# Patient Record
Sex: FEMALE | Race: WHITE | NOT HISPANIC OR LATINO | Employment: FULL TIME | URBAN - METROPOLITAN AREA
[De-identification: names, ages, dates, MRNs, and addresses within clinical notes are randomized per-mention and may not be internally consistent; named-entity substitution may affect disease eponyms.]

---

## 2017-01-04 ENCOUNTER — OFFICE VISIT (OUTPATIENT)
Dept: SURGERY | Facility: CLINIC | Age: 52
End: 2017-01-04

## 2017-01-04 DIAGNOSIS — Z09 SURGICAL FOLLOW-UP CARE: Primary | ICD-10-CM

## 2017-01-04 PROCEDURE — 99024 POSTOP FOLLOW-UP VISIT: CPT | Performed by: SURGERY

## 2017-01-04 NOTE — PROGRESS NOTES
5 wks s/p varun escobar pt is w/o complaints, incisions are healing well  Without complaints.  Her wounds are much improved.  She should cut back on the hydrogen peroxide shower daily ×1 week.  In then daily shower.  She may resume normal activities in 1 week.  I will see her back when necessary.

## 2017-01-04 NOTE — MR AVS SNAPSHOT
Marilyn Gottlieb   2017 1:15 PM   Office Visit    Dept Phone:  [phone]   Encounter #:  [id number]    Provider:  Clemente Meeks MD   Department:  [facility] GENERAL SURGERY                Your Full Care Plan              Your Updated Medication List          This list is accurate as of: 17  2:14 PM.  Always use your most recent med list.                amitriptyline 100 MG tablet   Commonly known as:  ELAVIL       lisinopril-hydrochlorothiazide 20-12.5 MG per tablet   Commonly known as:  PRINZIDE,ZESTORETIC       naproxen 500 MG tablet   Commonly known as:  NAPROSYN       omeprazole 20 MG capsule   Commonly known as:  priLOSEC       PARoxetine 40 MG tablet   Commonly known as:  PAXIL       topiramate 50 MG tablet   Commonly known as:  TOPAMAX               You Were Diagnosed With        Codes Comments    Surgical follow-up care    -  Primary ICD-10-CM: Z09  ICD-9-CM: V67.00       Instructions     None    Patient Instructions History      Upcoming Appointments     Visit Type Date Time Department    POST-OP 2017  1:15 PM [facility]      [employer] Signup     [facility] ipnexus allows you to send messages to your doctor, view your test results, renew your prescriptions, schedule appointments, and more. To sign up, go to [employer] and click on the Sign Up Now link in the New User? box. Enter your ipnexus Activation Code exactly as it appears below along with the last four digits of your Social Security Number and your Date of Birth () to complete the sign-up process. If you do not sign up before the expiration date, you must request a new code.    ipnexus Activation Code: [email or web address]  Expires: 2017  5:34 AM    If you have questions, you can email [email or web address] or call [phone] to talk to our ipnexus staff. Remember, ipnexus is NOT to be used for urgent needs. For medical emergencies, dial 911.               Other Info from Your Visit           Allergies     Codeine        Reason for Visit     Post-op Follow-up           Vital Signs     Smoking Status                   Current Every Day Smoker           Problems and Diagnoses Noted     Surgical follow-up care    -  Primary

## 2020-01-31 ENCOUNTER — APPOINTMENT (OUTPATIENT)
Dept: GENERAL RADIOLOGY | Facility: HOSPITAL | Age: 55
End: 2020-01-31

## 2020-01-31 ENCOUNTER — OFFICE VISIT (OUTPATIENT)
Dept: ORTHOPEDIC SURGERY | Facility: CLINIC | Age: 55
End: 2020-01-31

## 2020-01-31 ENCOUNTER — ANESTHESIA EVENT (OUTPATIENT)
Dept: PERIOP | Facility: HOSPITAL | Age: 55
End: 2020-01-31

## 2020-01-31 ENCOUNTER — PREP FOR SURGERY (OUTPATIENT)
Dept: OTHER | Facility: HOSPITAL | Age: 55
End: 2020-01-31

## 2020-01-31 ENCOUNTER — HOSPITAL ENCOUNTER (OUTPATIENT)
Facility: HOSPITAL | Age: 55
LOS: 1 days | Discharge: HOME | End: 2020-02-03
Attending: ORTHOPAEDIC SURGERY | Admitting: ORTHOPAEDIC SURGERY

## 2020-01-31 VITALS
HEIGHT: 65 IN | DIASTOLIC BLOOD PRESSURE: 66 MMHG | BODY MASS INDEX: 36.65 KG/M2 | SYSTOLIC BLOOD PRESSURE: 103 MMHG | HEART RATE: 101 BPM | WEIGHT: 220 LBS

## 2020-01-31 DIAGNOSIS — S93.432A SYNDESMOTIC DISRUPTION OF LEFT ANKLE, INITIAL ENCOUNTER: ICD-10-CM

## 2020-01-31 DIAGNOSIS — S82.62XA CLOSED DISPLACED FRACTURE OF LATERAL MALLEOLUS OF LEFT FIBULA, INITIAL ENCOUNTER: ICD-10-CM

## 2020-01-31 DIAGNOSIS — R52 PAIN: Primary | ICD-10-CM

## 2020-01-31 DIAGNOSIS — S82.62XA CLOSED DISPLACED FRACTURE OF LATERAL MALLEOLUS OF LEFT FIBULA, INITIAL ENCOUNTER: Primary | ICD-10-CM

## 2020-01-31 DIAGNOSIS — S80.02XA CONTUSION OF LEFT KNEE, INITIAL ENCOUNTER: ICD-10-CM

## 2020-01-31 DIAGNOSIS — S93.421A SPRAIN OF DELTOID LIGAMENT OF RIGHT ANKLE, INITIAL ENCOUNTER: ICD-10-CM

## 2020-01-31 PROBLEM — S82.63XA LATERAL MALLEOLAR FRACTURE: Status: ACTIVE | Noted: 2020-01-31

## 2020-01-31 LAB
ANION GAP SERPL CALCULATED.3IONS-SCNC: 10.6 MMOL/L (ref 5–15)
ANION GAP SERPL CALCULATED.3IONS-SCNC: 13 MMOL/L (ref 5–15)
ARTERIAL PATENCY WRIST A: POSITIVE
ATMOSPHERIC PRESS: 741 MMHG
ATMOSPHERIC PRESS: 741 MMHG
BASE EXCESS BLDA CALC-SCNC: 2.9 MMOL/L (ref 0–2)
BASE EXCESS BLDV CALC-SCNC: 4 MMOL/L (ref 0–2)
BASOPHILS # BLD AUTO: 0.03 10*3/MM3 (ref 0–0.2)
BASOPHILS NFR BLD AUTO: 0.3 % (ref 0–1.5)
BDY SITE: ABNORMAL
BDY SITE: ABNORMAL
BODY TEMPERATURE: 37 C
BODY TEMPERATURE: 37 C
BUN BLD-MCNC: 6 MG/DL (ref 6–20)
BUN BLD-MCNC: 8 MG/DL (ref 6–20)
BUN/CREAT SERPL: 10.7 (ref 7–25)
BUN/CREAT SERPL: 8.6 (ref 7–25)
CALCIUM SPEC-SCNC: 9.1 MG/DL (ref 8.6–10.5)
CALCIUM SPEC-SCNC: 9.2 MG/DL (ref 8.6–10.5)
CHLORIDE SERPL-SCNC: 83 MMOL/L (ref 98–107)
CHLORIDE SERPL-SCNC: 86 MMOL/L (ref 98–107)
CO2 SERPL-SCNC: 28 MMOL/L (ref 22–29)
CO2 SERPL-SCNC: 29.4 MMOL/L (ref 22–29)
CREAT BLD-MCNC: 0.7 MG/DL (ref 0.57–1)
CREAT BLD-MCNC: 0.75 MG/DL (ref 0.57–1)
DEPRECATED RDW RBC AUTO: 45.1 FL (ref 37–54)
EOSINOPHIL # BLD AUTO: 0.12 10*3/MM3 (ref 0–0.4)
EOSINOPHIL NFR BLD AUTO: 1.3 % (ref 0.3–6.2)
ERYTHROCYTE [DISTWIDTH] IN BLOOD BY AUTOMATED COUNT: 14.2 % (ref 12.3–15.4)
GAS FLOW AIRWAY: 3 LPM
GAS FLOW AIRWAY: 3 LPM
GFR SERPL CREATININE-BSD FRML MDRD: 81 ML/MIN/1.73
GFR SERPL CREATININE-BSD FRML MDRD: 88 ML/MIN/1.73
GLUCOSE BLD-MCNC: 196 MG/DL (ref 65–99)
GLUCOSE BLD-MCNC: 209 MG/DL (ref 65–99)
GLUCOSE BLDC GLUCOMTR-MCNC: 234 MG/DL (ref 70–130)
GLUCOSE BLDC GLUCOMTR-MCNC: 258 MG/DL (ref 70–130)
HBA1C MFR BLD: 7.6 % (ref 4.8–5.6)
HCO3 BLDA-SCNC: 29.6 MMOL/L (ref 20–26)
HCO3 BLDV-SCNC: 31.9 MMOL/L (ref 22–28)
HCT VFR BLD AUTO: 34.8 % (ref 34–46.6)
HGB BLD-MCNC: 12.1 G/DL (ref 12–15.9)
HGB BLDA-MCNC: 12.6 G/DL (ref 13.5–17.5)
HGB BLDA-MCNC: 13.1 G/DL (ref 13.5–17.5)
IMM GRANULOCYTES # BLD AUTO: 0.04 10*3/MM3 (ref 0–0.05)
IMM GRANULOCYTES NFR BLD AUTO: 0.4 % (ref 0–0.5)
LYMPHOCYTES # BLD AUTO: 1.52 10*3/MM3 (ref 0.7–3.1)
LYMPHOCYTES NFR BLD AUTO: 16.3 % (ref 19.6–45.3)
Lab: ABNORMAL
MCH RBC QN AUTO: 30.2 PG (ref 26.6–33)
MCHC RBC AUTO-ENTMCNC: 34.8 G/DL (ref 31.5–35.7)
MCV RBC AUTO: 86.8 FL (ref 79–97)
MODALITY: ABNORMAL
MODALITY: ABNORMAL
MONOCYTES # BLD AUTO: 0.27 10*3/MM3 (ref 0.1–0.9)
MONOCYTES NFR BLD AUTO: 2.9 % (ref 5–12)
NEUTROPHILS # BLD AUTO: 7.32 10*3/MM3 (ref 1.7–7)
NEUTROPHILS NFR BLD AUTO: 78.8 % (ref 42.7–76)
NOTIFIED BY: ABNORMAL
NOTIFIED WHO: ABNORMAL
NRBC BLD AUTO-RTO: 0 /100 WBC (ref 0–0.2)
PCO2 BLDA: 53.6 MM HG (ref 35–45)
PCO2 BLDV: 62.2 MM HG (ref 41–51)
PCO2 TEMP ADJ BLD: 53.6 MM HG (ref 35–45)
PH BLDA: 7.35 PH UNITS (ref 7.35–7.45)
PH BLDV: 7.32 PH UNITS (ref 7.32–7.42)
PH, TEMP CORRECTED: 7.35 PH UNITS (ref 7.35–7.45)
PLATELET # BLD AUTO: 188 10*3/MM3 (ref 140–450)
PMV BLD AUTO: 9.8 FL (ref 6–12)
PO2 BLDA: 88 MM HG (ref 83–108)
PO2 BLDV: 24.9 MM HG (ref 27–53)
PO2 TEMP ADJ BLD: 88 MM HG (ref 83–108)
POTASSIUM BLD-SCNC: 3.7 MMOL/L (ref 3.5–5.2)
POTASSIUM BLD-SCNC: 3.7 MMOL/L (ref 3.5–5.2)
RBC # BLD AUTO: 4.01 10*6/MM3 (ref 3.77–5.28)
SAO2 % BLDCOA: 97.3 % (ref 94–99)
SAO2 % BLDCOV: 37.1 % (ref 45–75)
SODIUM BLD-SCNC: 123 MMOL/L (ref 136–145)
SODIUM BLD-SCNC: 127 MMOL/L (ref 136–145)
VENTILATOR MODE: ABNORMAL
VENTILATOR MODE: ABNORMAL
WBC NRBC COR # BLD: 9.3 10*3/MM3 (ref 3.4–10.8)

## 2020-01-31 PROCEDURE — 90674 CCIIV4 VAC NO PRSV 0.5 ML IM: CPT | Performed by: ORTHOPAEDIC SURGERY

## 2020-01-31 PROCEDURE — 73600 X-RAY EXAM OF ANKLE: CPT | Performed by: ORTHOPAEDIC SURGERY

## 2020-01-31 PROCEDURE — S0260 H&P FOR SURGERY: HCPCS | Performed by: ORTHOPAEDIC SURGERY

## 2020-01-31 PROCEDURE — 63710000001 INSULIN ASPART PER 5 UNITS: Performed by: HOSPITALIST

## 2020-01-31 PROCEDURE — 36600 WITHDRAWAL OF ARTERIAL BLOOD: CPT

## 2020-01-31 PROCEDURE — G0008 ADMIN INFLUENZA VIRUS VAC: HCPCS | Performed by: ORTHOPAEDIC SURGERY

## 2020-01-31 PROCEDURE — 83036 HEMOGLOBIN GLYCOSYLATED A1C: CPT | Performed by: HOSPITALIST

## 2020-01-31 PROCEDURE — 82803 BLOOD GASES ANY COMBINATION: CPT

## 2020-01-31 PROCEDURE — 25010000002 HYDROMORPHONE PER 4 MG: Performed by: ORTHOPAEDIC SURGERY

## 2020-01-31 PROCEDURE — 73610 X-RAY EXAM OF ANKLE: CPT | Performed by: ORTHOPAEDIC SURGERY

## 2020-01-31 PROCEDURE — 80048 BASIC METABOLIC PNL TOTAL CA: CPT | Performed by: ORTHOPAEDIC SURGERY

## 2020-01-31 PROCEDURE — 99204 OFFICE O/P NEW MOD 45 MIN: CPT | Performed by: ORTHOPAEDIC SURGERY

## 2020-01-31 PROCEDURE — 85025 COMPLETE CBC W/AUTO DIFF WBC: CPT | Performed by: ORTHOPAEDIC SURGERY

## 2020-01-31 PROCEDURE — 71046 X-RAY EXAM CHEST 2 VIEWS: CPT

## 2020-01-31 PROCEDURE — 25010000002 NALOXONE PER 1 MG: Performed by: HOSPITALIST

## 2020-01-31 PROCEDURE — 25010000002 INFLUENZA VAC SUBUNIT QUAD 0.5 ML SUSPENSION PREFILLED SYRINGE: Performed by: ORTHOPAEDIC SURGERY

## 2020-01-31 PROCEDURE — 93010 ELECTROCARDIOGRAM REPORT: CPT | Performed by: INTERNAL MEDICINE

## 2020-01-31 PROCEDURE — 96376 TX/PRO/DX INJ SAME DRUG ADON: CPT

## 2020-01-31 PROCEDURE — 93005 ELECTROCARDIOGRAM TRACING: CPT | Performed by: ORTHOPAEDIC SURGERY

## 2020-01-31 PROCEDURE — 94799 UNLISTED PULMONARY SVC/PX: CPT

## 2020-01-31 PROCEDURE — G0379 DIRECT REFER HOSPITAL OBSERV: HCPCS

## 2020-01-31 PROCEDURE — 25010000002 KETOROLAC TROMETHAMINE PER 15 MG: Performed by: ORTHOPAEDIC SURGERY

## 2020-01-31 PROCEDURE — 96375 TX/PRO/DX INJ NEW DRUG ADDON: CPT

## 2020-01-31 PROCEDURE — 82962 GLUCOSE BLOOD TEST: CPT

## 2020-01-31 PROCEDURE — 73560 X-RAY EXAM OF KNEE 1 OR 2: CPT | Performed by: ORTHOPAEDIC SURGERY

## 2020-01-31 PROCEDURE — G0378 HOSPITAL OBSERVATION PER HR: HCPCS

## 2020-01-31 PROCEDURE — 80048 BASIC METABOLIC PNL TOTAL CA: CPT | Performed by: HOSPITALIST

## 2020-01-31 PROCEDURE — 99243 OFF/OP CNSLTJ NEW/EST LOW 30: CPT | Performed by: HOSPITALIST

## 2020-01-31 RX ORDER — SODIUM CHLORIDE 0.9 % (FLUSH) 0.9 %
10 SYRINGE (ML) INJECTION AS NEEDED
Status: DISCONTINUED | OUTPATIENT
Start: 2020-01-31 | End: 2020-02-03 | Stop reason: HOSPADM

## 2020-01-31 RX ORDER — TOPIRAMATE 25 MG/1
50 TABLET ORAL NIGHTLY
Status: DISCONTINUED | OUTPATIENT
Start: 2020-01-31 | End: 2020-02-01

## 2020-01-31 RX ORDER — HYDROMORPHONE HYDROCHLORIDE 1 MG/ML
1 INJECTION, SOLUTION INTRAMUSCULAR; INTRAVENOUS; SUBCUTANEOUS
Status: DISCONTINUED | OUTPATIENT
Start: 2020-01-31 | End: 2020-02-01

## 2020-01-31 RX ORDER — SODIUM CHLORIDE 0.9 % (FLUSH) 0.9 %
10 SYRINGE (ML) INJECTION EVERY 12 HOURS SCHEDULED
Status: DISCONTINUED | OUTPATIENT
Start: 2020-01-31 | End: 2020-02-03 | Stop reason: HOSPADM

## 2020-01-31 RX ORDER — SODIUM CHLORIDE, SODIUM LACTATE, POTASSIUM CHLORIDE, CALCIUM CHLORIDE 600; 310; 30; 20 MG/100ML; MG/100ML; MG/100ML; MG/100ML
30 INJECTION, SOLUTION INTRAVENOUS CONTINUOUS
Status: DISCONTINUED | OUTPATIENT
Start: 2020-01-31 | End: 2020-01-31

## 2020-01-31 RX ORDER — PANTOPRAZOLE SODIUM 40 MG/1
40 TABLET, DELAYED RELEASE ORAL EVERY MORNING
Status: DISCONTINUED | OUTPATIENT
Start: 2020-02-01 | End: 2020-02-03 | Stop reason: HOSPADM

## 2020-01-31 RX ORDER — PREGABALIN 75 MG/1
150 CAPSULE ORAL ONCE
Status: CANCELLED | OUTPATIENT
Start: 2020-01-31 | End: 2020-01-31

## 2020-01-31 RX ORDER — AMITRIPTYLINE HYDROCHLORIDE 25 MG/1
100 TABLET, FILM COATED ORAL NIGHTLY
Status: DISCONTINUED | OUTPATIENT
Start: 2020-01-31 | End: 2020-02-01

## 2020-01-31 RX ORDER — DOCUSATE SODIUM 100 MG/1
100 CAPSULE, LIQUID FILLED ORAL DAILY
Status: DISCONTINUED | OUTPATIENT
Start: 2020-01-31 | End: 2020-02-03 | Stop reason: HOSPADM

## 2020-01-31 RX ORDER — SODIUM CHLORIDE 9 MG/ML
40 INJECTION, SOLUTION INTRAVENOUS AS NEEDED
Status: DISCONTINUED | OUTPATIENT
Start: 2020-01-31 | End: 2020-02-03 | Stop reason: HOSPADM

## 2020-01-31 RX ORDER — NICOTINE 21 MG/24HR
1 PATCH, TRANSDERMAL 24 HOURS TRANSDERMAL
Status: DISCONTINUED | OUTPATIENT
Start: 2020-01-31 | End: 2020-02-03 | Stop reason: HOSPADM

## 2020-01-31 RX ORDER — ACETAMINOPHEN 500 MG
1000 TABLET ORAL ONCE
Status: CANCELLED | OUTPATIENT
Start: 2020-02-01

## 2020-01-31 RX ORDER — CEFAZOLIN SODIUM 2 G/50ML
2 SOLUTION INTRAVENOUS ONCE
Status: CANCELLED | OUTPATIENT
Start: 2020-02-01

## 2020-01-31 RX ORDER — NALOXONE HCL 0.4 MG/ML
0.4 VIAL (ML) INJECTION
Status: DISCONTINUED | OUTPATIENT
Start: 2020-01-31 | End: 2020-02-01

## 2020-01-31 RX ORDER — PAROXETINE HYDROCHLORIDE 20 MG/1
40 TABLET, FILM COATED ORAL NIGHTLY
Status: DISCONTINUED | OUTPATIENT
Start: 2020-01-31 | End: 2020-02-03 | Stop reason: HOSPADM

## 2020-01-31 RX ORDER — SODIUM CHLORIDE 9 MG/ML
100 INJECTION, SOLUTION INTRAVENOUS CONTINUOUS
Status: DISCONTINUED | OUTPATIENT
Start: 2020-01-31 | End: 2020-01-31

## 2020-01-31 RX ORDER — 3% SODIUM CHLORIDE 3 G/100ML
1000 INJECTION, SOLUTION INTRAVENOUS CONTINUOUS
Status: DISCONTINUED | OUTPATIENT
Start: 2020-02-01 | End: 2020-02-01 | Stop reason: SDUPTHER

## 2020-01-31 RX ORDER — DEXTROSE MONOHYDRATE 25 G/50ML
25 INJECTION, SOLUTION INTRAVENOUS
Status: DISCONTINUED | OUTPATIENT
Start: 2020-01-31 | End: 2020-02-03 | Stop reason: HOSPADM

## 2020-01-31 RX ORDER — KETOROLAC TROMETHAMINE 30 MG/ML
30 INJECTION, SOLUTION INTRAMUSCULAR; INTRAVENOUS EVERY 6 HOURS PRN
Status: DISCONTINUED | OUTPATIENT
Start: 2020-01-31 | End: 2020-02-03 | Stop reason: HOSPADM

## 2020-01-31 RX ORDER — ONDANSETRON 2 MG/ML
4 INJECTION INTRAMUSCULAR; INTRAVENOUS EVERY 6 HOURS PRN
Status: DISCONTINUED | OUTPATIENT
Start: 2020-01-31 | End: 2020-02-03 | Stop reason: HOSPADM

## 2020-01-31 RX ORDER — HYDROCODONE BITARTRATE AND ACETAMINOPHEN 5; 325 MG/1; MG/1
1 TABLET ORAL EVERY 6 HOURS PRN
COMMUNITY
Start: 2020-01-30 | End: 2020-02-03 | Stop reason: HOSPADM

## 2020-01-31 RX ORDER — NICOTINE POLACRILEX 4 MG
15 LOZENGE BUCCAL
Status: DISCONTINUED | OUTPATIENT
Start: 2020-01-31 | End: 2020-02-03 | Stop reason: HOSPADM

## 2020-01-31 RX ADMIN — HYDROMORPHONE HYDROCHLORIDE 1 MG: 1 INJECTION, SOLUTION INTRAMUSCULAR; INTRAVENOUS; SUBCUTANEOUS at 15:55

## 2020-01-31 RX ADMIN — HYDROMORPHONE HYDROCHLORIDE 1 MG: 1 INJECTION, SOLUTION INTRAMUSCULAR; INTRAVENOUS; SUBCUTANEOUS at 18:14

## 2020-01-31 RX ADMIN — INFLUENZA A VIRUS A/IDAHO/07/2018 (H1N1) ANTIGEN (MDCK CELL DERIVED, PROPIOLACTONE INACTIVATED, INFLUENZA A VIRUS A/INDIANA/08/2018 (H3N2) ANTIGEN (MDCK CELL DERIVED, PROPIOLACTONE INACTIVATED), INFLUENZA B VIRUS B/SINGAPORE/INFTT-16-0610/2016 ANTIGEN (MDCK CELL DERIVED, PROPIOLACTONE INACTIVATED), INFLUENZA B VIRUS B/IOWA/06/2017 ANTIGEN (MDCK CELL DERIVED, PROPIOLACTONE INACTIVATED) 0.5 ML: 15; 15; 15; 15 INJECTION, SUSPENSION INTRAMUSCULAR at 17:41

## 2020-01-31 RX ADMIN — DOCUSATE SODIUM 100 MG: 100 CAPSULE, LIQUID FILLED ORAL at 17:40

## 2020-01-31 RX ADMIN — NICOTINE 1 PATCH: 7 PATCH, EXTENDED RELEASE TRANSDERMAL at 17:40

## 2020-01-31 RX ADMIN — INSULIN ASPART 3 UNITS: 100 INJECTION, SOLUTION INTRAVENOUS; SUBCUTANEOUS at 21:39

## 2020-01-31 RX ADMIN — NALOXONE HYDROCHLORIDE 0.4 MG: 0.4 INJECTION, SOLUTION INTRAMUSCULAR; INTRAVENOUS; SUBCUTANEOUS at 21:17

## 2020-01-31 RX ADMIN — KETOROLAC TROMETHAMINE 30 MG: 30 INJECTION, SOLUTION INTRAMUSCULAR at 21:39

## 2020-01-31 RX ADMIN — SODIUM CHLORIDE, PRESERVATIVE FREE 10 ML: 5 INJECTION INTRAVENOUS at 17:42

## 2020-01-31 NOTE — PROGRESS NOTES
Subjective: Left lower leg pain, right ankle pain     Patient ID: Marilyn Gottlieb is a 55 y.o. female.    Chief Complaint:    History of Present Illness 55-year-old female is seen for left leg and right ankle she injured yesterday at her home.  States she was walking at the foot of her driveway when she slipped apparently on a wet rock injuring primarily the left leg falling on her knee and in the ankle developed immediate pain and discomfort.  Was able to ambulate putting weight of the right ankle and was seen in the [facility] ER x-rayed and now presents here but since her injury yesterday the right ankle is now painful and was unable to bear much weight on the right ankle.       Social History     Occupational History   • Not on file   Tobacco Use   • Smoking status: Current Every Day Smoker     Packs/day: 1.00     Years: 20.00     Pack years: 20.00   • Smokeless tobacco: Never Used   Substance and Sexual Activity   • Alcohol use: No   • Drug use: No   • Sexual activity: Defer      Review of Systems   Constitutional: Negative for chills, diaphoresis, fever and unexpected weight change.   HENT: Negative for hearing loss, nosebleeds, sore throat and tinnitus.    Eyes: Negative for pain and visual disturbance.   Respiratory: Negative for cough, shortness of breath and wheezing.    Cardiovascular: Negative for chest pain and palpitations.   Gastrointestinal: Negative for abdominal pain, diarrhea, nausea and vomiting.   Endocrine: Negative for cold intolerance, heat intolerance and polydipsia.   Genitourinary: Negative for difficulty urinating, dysuria and hematuria.   Musculoskeletal: Positive for arthralgias and myalgias. Negative for joint swelling.   Skin: Negative for rash and wound.   Allergic/Immunologic: Negative for environmental allergies.   Neurological: Negative for dizziness, syncope and numbness.   Hematological: Does not bruise/bleed easily.   Psychiatric/Behavioral: Negative for dysphoric mood and  sleep disturbance. The patient is not nervous/anxious.          Past Medical History:   Diagnosis Date   • Acid reflux    • Anxiety    • Depression    • Eczema     ON TRUNK, BILATERAL ARMS, PATIENT SCRATCHES   • High cholesterol     SUPPOSED TO BE ON MEDICATIONS   • Hypertension    • Migraine     TAKES TOPAMAX   • MRSA infection     LEFT AXILLA 2014   • Panic attacks      Past Surgical History:   Procedure Laterality Date   • CHOLECYSTECTOMY N/A 11/30/2016    Procedure: CHOLECYSTECTOMY LAPAROSCOPIC;  Surgeon: Clemente Meeks MD;  Location: [facility];  Service:    • HYSTERECTOMY      ABDOMINAL   • ROTATOR CUFF REPAIR     • TUBAL ABDOMINAL LIGATION       Family History   Family history unknown: Yes         Objective:  Vitals:    01/31/20 1154   BP: 103/66   Pulse: 101         01/31/20  1154   Weight: 99.8 kg (220 lb)     Body mass index is 36.61 kg/m².        Ortho Exam    AP lateral oblique view of the left ankle from [facility] ER reviewed by me done yesterday shows a fracture of the lateral malleolus with the syndesmotic disruption and avulsion off of the medial malleolus i.e. a Jiang B fracture.  Repeat x-rays were done in the office today and again it shows much better clarity the lateral malleolar fracture with syndesmotic disruption with the avulsion off of the tip of the medial malleolus.  X-rays of the knee show what may be an avulsion type fracture of the tibial tubercle but there is no significant displacement.  X-ray of the right ankle is unremarkable.  No prior x-rays available in the right ankle.  She is alert and oriented x3.  Has normocephalic and sclerae clear.  With regard to the right ankle there is no swelling noted but there is tenderness over the medial deltoid.  Her calf is nontender as is her Achilles tendon.  Minimal tenderness laterally.  No instability with a negative drawer sign.  Good distal pulses good capillary refill no numbness or paresthesia.  With regard to the right leg there is  an abrasion over the tibial tubercle of the right leg the knee has no effusion swelling or erythema.  The right ankle shows bilateral swelling and tenderness over the lateral malleolus tenderness medially over the medial malleolus.  The Achilles tendon is negative and the calf is nontender.  She has good capillary refill severe pain with any attempted passive range of motion and holds the foot in a plantar flexed position.  She is good distal pulses skin is cool to touch is no abrasions about the ankle.    Assessment:        1. Pain    2. Closed displaced fracture of lateral malleolus of left fibula, initial encounter    3. Syndesmotic disruption of left ankle, initial encounter    4. Sprain of deltoid ligament of right ankle, initial encounter    5. Contusion of left knee, initial encounter           Plan: Spent over 25 minutes with the patient reviewing her x-rays and both legs and the x-ray of the left ankle particular showing that lateral malleolar fracture the syndesmotic disruption I discussed the surgery with her and feels the risk of surgery which include but not limited to infection need for multiple procedures to eradicate the infection including implant removal, delayed union or nonunion need for further surgery particular smoker she is.  Vascular injury, nerve injury paresthesia paralysis, tendon injury, the need for further surgery, and persistent pain despite a well-healed fracture.  Discussed the rehab which is 3 to 5 months and she understands.  Will admit to the hospital today for pain control proceed with surgery tomorrow.            Work Status:    AYSE query complete.    Orders:  Orders Placed This Encounter   Procedures   • XR Ankle 3+ View Right   • XR Ankle 2 View Left   • XR Knee 1 or 2 View Left       Medications:  No orders of the defined types were placed in this encounter.      Followup:  Return in about 2 weeks (around 2/14/2020).          Dictated utilizing Dragon dictation

## 2020-02-01 ENCOUNTER — ANESTHESIA (OUTPATIENT)
Dept: PERIOP | Facility: HOSPITAL | Age: 55
End: 2020-02-01

## 2020-02-01 ENCOUNTER — APPOINTMENT (OUTPATIENT)
Dept: GENERAL RADIOLOGY | Facility: HOSPITAL | Age: 55
End: 2020-02-01

## 2020-02-01 LAB
AMPHET+METHAMPHET UR QL: NEGATIVE
AMPHETAMINES UR QL: NEGATIVE
ANION GAP SERPL CALCULATED.3IONS-SCNC: 11.5 MMOL/L (ref 5–15)
BARBITURATES UR QL SCN: NEGATIVE
BENZODIAZ UR QL SCN: NEGATIVE
BUN BLD-MCNC: 9 MG/DL (ref 6–20)
BUN/CREAT SERPL: 13.2 (ref 7–25)
BUPRENORPHINE SERPL-MCNC: NEGATIVE NG/ML
CALCIUM SPEC-SCNC: 9.4 MG/DL (ref 8.6–10.5)
CANNABINOIDS SERPL QL: NEGATIVE
CHLORIDE SERPL-SCNC: 92 MMOL/L (ref 98–107)
CO2 SERPL-SCNC: 28.5 MMOL/L (ref 22–29)
COCAINE UR QL: NEGATIVE
CREAT BLD-MCNC: 0.68 MG/DL (ref 0.57–1)
GFR SERPL CREATININE-BSD FRML MDRD: 91 ML/MIN/1.73
GLUCOSE BLD-MCNC: 185 MG/DL (ref 65–99)
GLUCOSE BLDC GLUCOMTR-MCNC: 198 MG/DL (ref 70–130)
GLUCOSE BLDC GLUCOMTR-MCNC: 207 MG/DL (ref 70–130)
GLUCOSE BLDC GLUCOMTR-MCNC: 212 MG/DL (ref 70–130)
METHADONE UR QL SCN: NEGATIVE
OPIATES UR QL: POSITIVE
OXYCODONE UR QL SCN: NEGATIVE
PCP UR QL SCN: NEGATIVE
POTASSIUM BLD-SCNC: 4.2 MMOL/L (ref 3.5–5.2)
PROPOXYPH UR QL: NEGATIVE
SODIUM BLD-SCNC: 132 MMOL/L (ref 136–145)
TRICYCLICS UR QL SCN: POSITIVE
TSH SERPL DL<=0.05 MIU/L-ACNC: 2.98 UIU/ML (ref 0.27–4.2)

## 2020-02-01 PROCEDURE — 76000 FLUOROSCOPY <1 HR PHYS/QHP: CPT

## 2020-02-01 PROCEDURE — 96365 THER/PROPH/DIAG IV INF INIT: CPT

## 2020-02-01 PROCEDURE — 25010000002 MIDAZOLAM PER 1MG: Performed by: NURSE ANESTHETIST, CERTIFIED REGISTERED

## 2020-02-01 PROCEDURE — 63710000001 INSULIN ASPART PER 5 UNITS: Performed by: ORTHOPAEDIC SURGERY

## 2020-02-01 PROCEDURE — 94640 AIRWAY INHALATION TREATMENT: CPT

## 2020-02-01 PROCEDURE — G0378 HOSPITAL OBSERVATION PER HR: HCPCS

## 2020-02-01 PROCEDURE — 82962 GLUCOSE BLOOD TEST: CPT

## 2020-02-01 PROCEDURE — 25010000002 FENTANYL CITRATE (PF) 100 MCG/2ML SOLUTION: Performed by: NURSE ANESTHETIST, CERTIFIED REGISTERED

## 2020-02-01 PROCEDURE — 80048 BASIC METABOLIC PNL TOTAL CA: CPT | Performed by: FAMILY MEDICINE

## 2020-02-01 PROCEDURE — 25010000002 DEXAMETHASONE PER 1 MG: Performed by: NURSE ANESTHETIST, CERTIFIED REGISTERED

## 2020-02-01 PROCEDURE — 27829 TREAT LOWER LEG JOINT: CPT | Performed by: ORTHOPAEDIC SURGERY

## 2020-02-01 PROCEDURE — 73600 X-RAY EXAM OF ANKLE: CPT

## 2020-02-01 PROCEDURE — 27814 TREATMENT OF ANKLE FRACTURE: CPT | Performed by: ORTHOPAEDIC SURGERY

## 2020-02-01 PROCEDURE — 94799 UNLISTED PULMONARY SVC/PX: CPT

## 2020-02-01 PROCEDURE — 97161 PT EVAL LOW COMPLEX 20 MIN: CPT

## 2020-02-01 PROCEDURE — C1713 ANCHOR/SCREW BN/BN,TIS/BN: HCPCS | Performed by: ORTHOPAEDIC SURGERY

## 2020-02-01 PROCEDURE — 96376 TX/PRO/DX INJ SAME DRUG ADON: CPT

## 2020-02-01 PROCEDURE — 96366 THER/PROPH/DIAG IV INF ADDON: CPT

## 2020-02-01 PROCEDURE — 25010000002 KETOROLAC TROMETHAMINE PER 15 MG: Performed by: ORTHOPAEDIC SURGERY

## 2020-02-01 PROCEDURE — 97110 THERAPEUTIC EXERCISES: CPT

## 2020-02-01 PROCEDURE — 25010000002 KETOROLAC TROMETHAMINE PER 15 MG: Performed by: NURSE ANESTHETIST, CERTIFIED REGISTERED

## 2020-02-01 PROCEDURE — 25010000003 BUPIVACAINE LIPOSOME 1.3 % SUSPENSION: Performed by: ORTHOPAEDIC SURGERY

## 2020-02-01 PROCEDURE — 25010000002 ROPIVACAINE PER 1 MG: Performed by: NURSE ANESTHETIST, CERTIFIED REGISTERED

## 2020-02-01 PROCEDURE — 25010000002 ONDANSETRON PER 1 MG: Performed by: NURSE ANESTHETIST, CERTIFIED REGISTERED

## 2020-02-01 PROCEDURE — 94770: CPT

## 2020-02-01 PROCEDURE — C9290 INJ, BUPIVACAINE LIPOSOME: HCPCS | Performed by: ORTHOPAEDIC SURGERY

## 2020-02-01 PROCEDURE — 99225 PR SBSQ OBSERVATION CARE/DAY 25 MINUTES: CPT | Performed by: HOSPITALIST

## 2020-02-01 PROCEDURE — 25010000003 CEFAZOLIN SODIUM-DEXTROSE 2-3 GM-%(50ML) RECONSTITUTED SOLUTION: Performed by: ORTHOPAEDIC SURGERY

## 2020-02-01 PROCEDURE — 25010000002 PROPOFOL 10 MG/ML EMULSION: Performed by: NURSE ANESTHETIST, CERTIFIED REGISTERED

## 2020-02-01 PROCEDURE — 80306 DRUG TEST PRSMV INSTRMNT: CPT | Performed by: HOSPITALIST

## 2020-02-01 PROCEDURE — 25010000002 VANCOMYCIN 1 G RECONSTITUTED SOLUTION: Performed by: ORTHOPAEDIC SURGERY

## 2020-02-01 PROCEDURE — 25010000002 MAGNESIUM SULFATE 2 GM/50ML SOLUTION: Performed by: NURSE ANESTHETIST, CERTIFIED REGISTERED

## 2020-02-01 PROCEDURE — 84443 ASSAY THYROID STIM HORMONE: CPT | Performed by: HOSPITALIST

## 2020-02-01 DEVICE — LOCKING SCREW, T10
Type: IMPLANTABLE DEVICE | Site: ANKLE | Status: FUNCTIONAL
Brand: VARIAX

## 2020-02-01 DEVICE — BONE SCREW, T10
Type: IMPLANTABLE DEVICE | Site: ANKLE | Status: FUNCTIONAL
Brand: VARIAX

## 2020-02-01 DEVICE — KT SYNDESMOSIS REPR ANKL TIGHTROPE KNOTLS TI: Type: IMPLANTABLE DEVICE | Site: ANKLE | Status: FUNCTIONAL

## 2020-02-01 DEVICE — DISTAL LATERAL FIBULA PLATE, 4 HOLE
Type: IMPLANTABLE DEVICE | Site: ANKLE | Status: FUNCTIONAL
Brand: VARIAX

## 2020-02-01 DEVICE — WAX BONE HEMO NAT 2.5G: Type: IMPLANTABLE DEVICE | Site: ANKLE | Status: FUNCTIONAL

## 2020-02-01 RX ORDER — FENTANYL CITRATE 50 UG/ML
INJECTION, SOLUTION INTRAMUSCULAR; INTRAVENOUS AS NEEDED
Status: DISCONTINUED | OUTPATIENT
Start: 2020-02-01 | End: 2020-02-01 | Stop reason: SURG

## 2020-02-01 RX ORDER — FAMOTIDINE 10 MG/ML
20 INJECTION, SOLUTION INTRAVENOUS
Status: COMPLETED | OUTPATIENT
Start: 2020-02-01 | End: 2020-02-01

## 2020-02-01 RX ORDER — ROPIVACAINE HYDROCHLORIDE 5 MG/ML
INJECTION, SOLUTION EPIDURAL; INFILTRATION; PERINEURAL
Status: COMPLETED | OUTPATIENT
Start: 2020-02-01 | End: 2020-02-01

## 2020-02-01 RX ORDER — ONDANSETRON 2 MG/ML
4 INJECTION INTRAMUSCULAR; INTRAVENOUS ONCE AS NEEDED
Status: DISCONTINUED | OUTPATIENT
Start: 2020-02-01 | End: 2020-02-01 | Stop reason: HOSPADM

## 2020-02-01 RX ORDER — ONDANSETRON 2 MG/ML
4 INJECTION INTRAMUSCULAR; INTRAVENOUS ONCE AS NEEDED
Status: COMPLETED | OUTPATIENT
Start: 2020-02-01 | End: 2020-02-01

## 2020-02-01 RX ORDER — ASPIRIN 325 MG
325 TABLET ORAL EVERY 12 HOURS SCHEDULED
Status: DISCONTINUED | OUTPATIENT
Start: 2020-02-02 | End: 2020-02-03 | Stop reason: HOSPADM

## 2020-02-01 RX ORDER — DEXMEDETOMIDINE HYDROCHLORIDE 100 UG/ML
INJECTION, SOLUTION INTRAVENOUS AS NEEDED
Status: DISCONTINUED | OUTPATIENT
Start: 2020-02-01 | End: 2020-02-01 | Stop reason: SURG

## 2020-02-01 RX ORDER — LORAZEPAM 2 MG/ML
1 INJECTION INTRAMUSCULAR
Status: DISCONTINUED | OUTPATIENT
Start: 2020-02-01 | End: 2020-02-01 | Stop reason: HOSPADM

## 2020-02-01 RX ORDER — MAGNESIUM SULFATE HEPTAHYDRATE 40 MG/ML
2 INJECTION, SOLUTION INTRAVENOUS ONCE
Status: COMPLETED | OUTPATIENT
Start: 2020-02-01 | End: 2020-02-01

## 2020-02-01 RX ORDER — SODIUM CHLORIDE, SODIUM LACTATE, POTASSIUM CHLORIDE, CALCIUM CHLORIDE 600; 310; 30; 20 MG/100ML; MG/100ML; MG/100ML; MG/100ML
30 INJECTION, SOLUTION INTRAVENOUS CONTINUOUS
Status: DISCONTINUED | OUTPATIENT
Start: 2020-02-01 | End: 2020-02-01

## 2020-02-01 RX ORDER — 3% SODIUM CHLORIDE 3 G/100ML
500 INJECTION, SOLUTION INTRAVENOUS CONTINUOUS
Status: DISCONTINUED | OUTPATIENT
Start: 2020-02-01 | End: 2020-02-01

## 2020-02-01 RX ORDER — SODIUM CHLORIDE 9 MG/ML
75 INJECTION, SOLUTION INTRAVENOUS CONTINUOUS
Status: DISCONTINUED | OUTPATIENT
Start: 2020-02-01 | End: 2020-02-01

## 2020-02-01 RX ORDER — SODIUM CHLORIDE 9 MG/ML
40 INJECTION, SOLUTION INTRAVENOUS AS NEEDED
Status: DISCONTINUED | OUTPATIENT
Start: 2020-02-01 | End: 2020-02-01 | Stop reason: HOSPADM

## 2020-02-01 RX ORDER — SODIUM CHLORIDE, SODIUM LACTATE, POTASSIUM CHLORIDE, CALCIUM CHLORIDE 600; 310; 30; 20 MG/100ML; MG/100ML; MG/100ML; MG/100ML
9 INJECTION, SOLUTION INTRAVENOUS CONTINUOUS
Status: DISCONTINUED | OUTPATIENT
Start: 2020-02-01 | End: 2020-02-01

## 2020-02-01 RX ORDER — CEFAZOLIN SODIUM 2 G/50ML
2 SOLUTION INTRAVENOUS EVERY 8 HOURS
Status: COMPLETED | OUTPATIENT
Start: 2020-02-01 | End: 2020-02-02

## 2020-02-01 RX ORDER — HYDROMORPHONE HYDROCHLORIDE 1 MG/ML
1 INJECTION, SOLUTION INTRAMUSCULAR; INTRAVENOUS; SUBCUTANEOUS
Status: DISCONTINUED | OUTPATIENT
Start: 2020-02-01 | End: 2020-02-01 | Stop reason: HOSPADM

## 2020-02-01 RX ORDER — MIDAZOLAM HYDROCHLORIDE 2 MG/2ML
2 INJECTION, SOLUTION INTRAMUSCULAR; INTRAVENOUS
Status: DISCONTINUED | OUTPATIENT
Start: 2020-02-01 | End: 2020-02-01 | Stop reason: HOSPADM

## 2020-02-01 RX ORDER — ACETAMINOPHEN 500 MG
1000 TABLET ORAL ONCE
Status: COMPLETED | OUTPATIENT
Start: 2020-02-01 | End: 2020-02-01

## 2020-02-01 RX ORDER — OXYCODONE AND ACETAMINOPHEN 7.5; 325 MG/1; MG/1
2 TABLET ORAL EVERY 4 HOURS PRN
Status: DISCONTINUED | OUTPATIENT
Start: 2020-02-01 | End: 2020-02-03 | Stop reason: HOSPADM

## 2020-02-01 RX ORDER — DIAPER,BRIEF,INFANT-TODD,DISP
EACH MISCELLANEOUS AS NEEDED
Status: DISCONTINUED | OUTPATIENT
Start: 2020-02-01 | End: 2020-02-01 | Stop reason: HOSPADM

## 2020-02-01 RX ORDER — SODIUM CHLORIDE, SODIUM LACTATE, POTASSIUM CHLORIDE, CALCIUM CHLORIDE 600; 310; 30; 20 MG/100ML; MG/100ML; MG/100ML; MG/100ML
100 INJECTION, SOLUTION INTRAVENOUS CONTINUOUS
Status: DISCONTINUED | OUTPATIENT
Start: 2020-02-01 | End: 2020-02-01

## 2020-02-01 RX ORDER — SODIUM CHLORIDE 0.9 % (FLUSH) 0.9 %
10 SYRINGE (ML) INJECTION EVERY 12 HOURS SCHEDULED
Status: DISCONTINUED | OUTPATIENT
Start: 2020-02-01 | End: 2020-02-01 | Stop reason: HOSPADM

## 2020-02-01 RX ORDER — FAMOTIDINE 20 MG/1
20 TABLET, FILM COATED ORAL
Status: COMPLETED | OUTPATIENT
Start: 2020-02-01 | End: 2020-02-01

## 2020-02-01 RX ORDER — PROPOFOL 10 MG/ML
VIAL (ML) INTRAVENOUS AS NEEDED
Status: DISCONTINUED | OUTPATIENT
Start: 2020-02-01 | End: 2020-02-01 | Stop reason: SURG

## 2020-02-01 RX ORDER — PREGABALIN 75 MG/1
150 CAPSULE ORAL ONCE
Status: COMPLETED | OUTPATIENT
Start: 2020-02-01 | End: 2020-02-01

## 2020-02-01 RX ORDER — KETAMINE HYDROCHLORIDE 10 MG/ML
INJECTION INTRAMUSCULAR; INTRAVENOUS AS NEEDED
Status: DISCONTINUED | OUTPATIENT
Start: 2020-02-01 | End: 2020-02-01 | Stop reason: SURG

## 2020-02-01 RX ORDER — VANCOMYCIN HYDROCHLORIDE 1 G/20ML
INJECTION, POWDER, LYOPHILIZED, FOR SOLUTION INTRAVENOUS AS NEEDED
Status: DISCONTINUED | OUTPATIENT
Start: 2020-02-01 | End: 2020-02-01 | Stop reason: HOSPADM

## 2020-02-01 RX ORDER — MIDAZOLAM HYDROCHLORIDE 2 MG/2ML
1 INJECTION, SOLUTION INTRAMUSCULAR; INTRAVENOUS
Status: DISCONTINUED | OUTPATIENT
Start: 2020-02-01 | End: 2020-02-01 | Stop reason: HOSPADM

## 2020-02-01 RX ORDER — SODIUM CHLORIDE 0.9 % (FLUSH) 0.9 %
10 SYRINGE (ML) INJECTION AS NEEDED
Status: DISCONTINUED | OUTPATIENT
Start: 2020-02-01 | End: 2020-02-01 | Stop reason: HOSPADM

## 2020-02-01 RX ORDER — LIDOCAINE HYDROCHLORIDE 20 MG/ML
INJECTION, SOLUTION INFILTRATION; PERINEURAL AS NEEDED
Status: DISCONTINUED | OUTPATIENT
Start: 2020-02-01 | End: 2020-02-01 | Stop reason: SURG

## 2020-02-01 RX ORDER — HYDROMORPHONE HYDROCHLORIDE 1 MG/ML
0.5 INJECTION, SOLUTION INTRAMUSCULAR; INTRAVENOUS; SUBCUTANEOUS AS NEEDED
Status: DISCONTINUED | OUTPATIENT
Start: 2020-02-01 | End: 2020-02-01 | Stop reason: HOSPADM

## 2020-02-01 RX ORDER — 3% SODIUM CHLORIDE 3 G/100ML
1000 INJECTION, SOLUTION INTRAVENOUS CONTINUOUS
Status: CANCELLED | OUTPATIENT
Start: 2020-02-01 | End: 2020-02-02

## 2020-02-01 RX ORDER — KETOROLAC TROMETHAMINE 30 MG/ML
INJECTION, SOLUTION INTRAMUSCULAR; INTRAVENOUS AS NEEDED
Status: DISCONTINUED | OUTPATIENT
Start: 2020-02-01 | End: 2020-02-01 | Stop reason: SURG

## 2020-02-01 RX ORDER — LIDOCAINE HYDROCHLORIDE 10 MG/ML
0.5 INJECTION, SOLUTION EPIDURAL; INFILTRATION; INTRACAUDAL; PERINEURAL ONCE AS NEEDED
Status: DISCONTINUED | OUTPATIENT
Start: 2020-02-01 | End: 2020-02-01 | Stop reason: HOSPADM

## 2020-02-01 RX ORDER — CEFAZOLIN SODIUM 2 G/50ML
2 SOLUTION INTRAVENOUS ONCE
Status: DISCONTINUED | OUTPATIENT
Start: 2020-02-01 | End: 2020-02-01 | Stop reason: HOSPADM

## 2020-02-01 RX ORDER — DEXAMETHASONE SODIUM PHOSPHATE 4 MG/ML
8 INJECTION, SOLUTION INTRA-ARTICULAR; INTRALESIONAL; INTRAMUSCULAR; INTRAVENOUS; SOFT TISSUE ONCE AS NEEDED
Status: COMPLETED | OUTPATIENT
Start: 2020-02-01 | End: 2020-02-01

## 2020-02-01 RX ORDER — GLYCOPYRROLATE 0.2 MG/ML
0.1 INJECTION INTRAMUSCULAR; INTRAVENOUS
Status: COMPLETED | OUTPATIENT
Start: 2020-02-01 | End: 2020-02-01

## 2020-02-01 RX ORDER — MAGNESIUM HYDROXIDE 1200 MG/15ML
LIQUID ORAL AS NEEDED
Status: DISCONTINUED | OUTPATIENT
Start: 2020-02-01 | End: 2020-02-01 | Stop reason: HOSPADM

## 2020-02-01 RX ORDER — IPRATROPIUM BROMIDE AND ALBUTEROL SULFATE 2.5; .5 MG/3ML; MG/3ML
3 SOLUTION RESPIRATORY (INHALATION) ONCE
Status: COMPLETED | OUTPATIENT
Start: 2020-02-01 | End: 2020-02-01

## 2020-02-01 RX ADMIN — KETAMINE HYDROCHLORIDE 10 MG: 10 INJECTION INTRAMUSCULAR; INTRAVENOUS at 10:19

## 2020-02-01 RX ADMIN — KETOROLAC TROMETHAMINE 60 MG: 30 INJECTION INTRAMUSCULAR; INTRAVENOUS at 10:23

## 2020-02-01 RX ADMIN — INSULIN ASPART 3 UNITS: 100 INJECTION, SOLUTION INTRAVENOUS; SUBCUTANEOUS at 12:27

## 2020-02-01 RX ADMIN — SODIUM CHLORIDE 75 ML/HR: 9 INJECTION, SOLUTION INTRAVENOUS at 07:17

## 2020-02-01 RX ADMIN — GLYCOPYRROLATE 0.1 MG: 0.2 INJECTION INTRAMUSCULAR; INTRAVENOUS at 08:48

## 2020-02-01 RX ADMIN — SODIUM CHLORIDE, POTASSIUM CHLORIDE, SODIUM LACTATE AND CALCIUM CHLORIDE 9 ML/HR: 600; 310; 30; 20 INJECTION, SOLUTION INTRAVENOUS at 09:15

## 2020-02-01 RX ADMIN — DEXAMETHASONE SODIUM PHOSPHATE 8 MG: 4 INJECTION, SOLUTION INTRAMUSCULAR; INTRAVENOUS at 08:48

## 2020-02-01 RX ADMIN — MAGNESIUM SULFATE IN WATER 2 G: 40 INJECTION, SOLUTION INTRAVENOUS at 10:09

## 2020-02-01 RX ADMIN — PREGABALIN 150 MG: 75 CAPSULE ORAL at 08:45

## 2020-02-01 RX ADMIN — METFORMIN HYDROCHLORIDE 500 MG: 500 TABLET ORAL at 17:26

## 2020-02-01 RX ADMIN — PROPOFOL 50 MG: 10 INJECTION, EMULSION INTRAVENOUS at 09:59

## 2020-02-01 RX ADMIN — VANCOMYCIN HYDROCHLORIDE 1 G: 1 INJECTION, POWDER, LYOPHILIZED, FOR SOLUTION INTRAVENOUS at 10:16

## 2020-02-01 RX ADMIN — INSULIN ASPART 3 UNITS: 100 INJECTION, SOLUTION INTRAVENOUS; SUBCUTANEOUS at 17:27

## 2020-02-01 RX ADMIN — DEXMEDETOMIDINE HYDROCHLORIDE 20 MCG: 100 INJECTION, SOLUTION, CONCENTRATE INTRAVENOUS at 09:02

## 2020-02-01 RX ADMIN — SODIUM CHLORIDE 1000 ML: 3 INJECTION, SOLUTION INTRAVENOUS at 00:05

## 2020-02-01 RX ADMIN — FAMOTIDINE 20 MG: 10 INJECTION INTRAVENOUS at 08:48

## 2020-02-01 RX ADMIN — DOCUSATE SODIUM 100 MG: 100 CAPSULE, LIQUID FILLED ORAL at 17:26

## 2020-02-01 RX ADMIN — FENTANYL CITRATE 25 MCG: 50 INJECTION, SOLUTION INTRAMUSCULAR; INTRAVENOUS at 09:59

## 2020-02-01 RX ADMIN — NICOTINE 1 PATCH: 21 PATCH, EXTENDED RELEASE TRANSDERMAL at 05:15

## 2020-02-01 RX ADMIN — SODIUM CHLORIDE, POTASSIUM CHLORIDE, SODIUM LACTATE AND CALCIUM CHLORIDE 100 ML/HR: 600; 310; 30; 20 INJECTION, SOLUTION INTRAVENOUS at 11:57

## 2020-02-01 RX ADMIN — NICOTINE 1 PATCH: 21 PATCH, EXTENDED RELEASE TRANSDERMAL at 12:25

## 2020-02-01 RX ADMIN — PAROXETINE HYDROCHLORIDE 40 MG: 20 TABLET, FILM COATED ORAL at 22:06

## 2020-02-01 RX ADMIN — ROPIVACAINE HYDROCHLORIDE 30 ML: 5 INJECTION, SOLUTION EPIDURAL; INFILTRATION; PERINEURAL at 09:15

## 2020-02-01 RX ADMIN — ACETAMINOPHEN 1000 MG: 500 TABLET, FILM COATED ORAL at 08:45

## 2020-02-01 RX ADMIN — FENTANYL CITRATE 25 MCG: 50 INJECTION, SOLUTION INTRAMUSCULAR; INTRAVENOUS at 10:02

## 2020-02-01 RX ADMIN — ROPIVACAINE HYDROCHLORIDE 30 ML: 5 INJECTION, SOLUTION EPIDURAL; INFILTRATION; PERINEURAL at 09:09

## 2020-02-01 RX ADMIN — DEXMEDETOMIDINE HYDROCHLORIDE 20 MCG: 100 INJECTION, SOLUTION, CONCENTRATE INTRAVENOUS at 09:43

## 2020-02-01 RX ADMIN — SODIUM CHLORIDE, PRESERVATIVE FREE 10 ML: 5 INJECTION INTRAVENOUS at 22:07

## 2020-02-01 RX ADMIN — CEFAZOLIN SODIUM 2 G: 2 SOLUTION INTRAVENOUS at 22:05

## 2020-02-01 RX ADMIN — VANCOMYCIN 1500 MG: 1 INJECTION, SOLUTION INTRAVENOUS at 23:22

## 2020-02-01 RX ADMIN — LIDOCAINE HYDROCHLORIDE 100 MG: 20 INJECTION, SOLUTION INFILTRATION; PERINEURAL at 09:36

## 2020-02-01 RX ADMIN — IPRATROPIUM BROMIDE AND ALBUTEROL SULFATE 3 ML: .5; 3 SOLUTION RESPIRATORY (INHALATION) at 08:34

## 2020-02-01 RX ADMIN — INSULIN ASPART 2 UNITS: 100 INJECTION, SOLUTION INTRAVENOUS; SUBCUTANEOUS at 21:02

## 2020-02-01 RX ADMIN — KETOROLAC TROMETHAMINE 30 MG: 30 INJECTION, SOLUTION INTRAMUSCULAR at 05:15

## 2020-02-01 RX ADMIN — MIDAZOLAM HYDROCHLORIDE 1 MG: 1 INJECTION, SOLUTION INTRAMUSCULAR; INTRAVENOUS at 08:54

## 2020-02-01 RX ADMIN — SODIUM CHLORIDE: 9 INJECTION, SOLUTION INTRAVENOUS at 08:49

## 2020-02-01 RX ADMIN — PROPOFOL 150 MG: 10 INJECTION, EMULSION INTRAVENOUS at 09:37

## 2020-02-01 RX ADMIN — ONDANSETRON 4 MG: 2 INJECTION, SOLUTION INTRAMUSCULAR; INTRAVENOUS at 08:47

## 2020-02-01 RX ADMIN — KETAMINE HYDROCHLORIDE 50 MG: 10 INJECTION INTRAMUSCULAR; INTRAVENOUS at 09:37

## 2020-02-01 NOTE — NURSING NOTE
Continued Stay Note  ORLANDO Goode     Patient Name: Marilyn Gottlieb  MRN: [id number]  Today's Date: 2/1/2020    Admit Date: 1/31/2020    Discharge Plan     Row Name 02/01/20 0943       Plan    Plan Comments  patient is in surgery.  Will continue to follow        Discharge Codes    No documentation.             Niru Chanel RN
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
## 2020-02-01 NOTE — PLAN OF CARE
Problem: Patient Care Overview  Goal: Plan of Care Review  Outcome: Ongoing (interventions implemented as appropriate)  Flowsheets (Taken 2/1/2020 1533)  Progress: improving  Note:   Pod#0 left ankle orif, denies pain/ discomfort . Awake and alert, tolerating po, diet advanced to regular without difficulty. Nwb to lle, up with pt. VSS.

## 2020-02-01 NOTE — ANESTHESIA PROCEDURE NOTES
Peripheral Block    Pre-sedation assessment completed: 2/1/2020 9:05 AM    Patient location during procedure: pre-op  Start time: 2/1/2020 9:08 AM  Stop time: 2/1/2020 9:10 AM  Reason for block: post-op pain management  Preanesthetic Checklist  Completed: patient identified, site marked, surgical consent, pre-op evaluation, timeout performed, IV checked, risks and benefits discussed and monitors and equipment checked  Prep:  Pt Position: supine  Sterile barriers:cap, gloves and mask  Prep: ChloraPrep  Patient monitoring: blood pressure monitoring, continuous pulse oximetry and EKG  Procedure  Sedation:yes  Performed under: MAC  Guidance:nerve stimulator  Images:still images not obtained    Laterality:left  Block Type:femoral  Injection Technique:single-shot  Needle Type:short-bevel  Resistance on Injection: none    Medications Used: ropivacaine (NAROPIN) injection 0.5 %, 30 mL  Med admintered at 2/1/2020 9:09 AM      Post Assessment  Injection Assessment: negative aspiration for heme, no paresthesia on injection and incremental injection  Patient Tolerance:comfortable throughout block  Complications:no

## 2020-02-01 NOTE — CONSULTS
"[facility] HOSPITALIST     Leni Bolden MD    Reason for Consult: Pre-op eval and management of HTN    HISTORY OF PRESENT ILLNESS:    Ms. Gottlieb is a 56 y/o  female who presents after a fall at home.  She was coming down her steps when when she got her foot caught and fell forward.  She reports she knew immediately she \"broke\" something due to the pain and nausea.  She did not hit her head, and she did not lose consciousness.  She was able to bear weight, and was seen at the [facility] ER.  She was referred to Dr. Nunn who has admitted her for operative management.    She does note some dyspnea with exertion, but it doesn't limit her activities.  She does play w/ her grandkids and denies chest pain and dyspnea.  She will get short-of-breath climbing stairs, but denies chest pain.  No pain w/ light housework.  She has never been told her blood glucose was high.      Past Medical History:   Diagnosis Date   • Acid reflux    • Anxiety    • Depression    • Eczema     ON TRUNK, BILATERAL ARMS, PATIENT SCRATCHES   • High cholesterol     SUPPOSED TO BE ON MEDICATIONS   • Hypertension    • Migraine     TAKES TOPAMAX   • MRSA infection     LEFT AXILLA 2014   • Panic attacks      Past Surgical History:   Procedure Laterality Date   • CHOLECYSTECTOMY N/A 11/30/2016    Procedure: CHOLECYSTECTOMY LAPAROSCOPIC;  Surgeon: Clemente Meeks MD;  Location: [facility];  Service:    • HYSTERECTOMY      ABDOMINAL   • ROTATOR CUFF REPAIR     • TUBAL ABDOMINAL LIGATION       Family History   Problem Relation Age of Onset   • Cancer Father      Social History     Tobacco Use   • Smoking status: Current Every Day Smoker     Packs/day: 0.50     Years: 20.00     Pack years: 10.00   • Smokeless tobacco: Never Used   Substance Use Topics   • Alcohol use: No   • Drug use: No     Medications Prior to Admission   Medication Sig Dispense Refill Last Dose   • amitriptyline (ELAVIL) 100 MG tablet Take 100 mg by mouth " "Every Night.   1/30/2020 at Unknown time   • lisinopril-hydrochlorothiazide (PRINZIDE,ZESTORETIC) 20-12.5 MG per tablet Take 1 tablet by mouth Every Night.   1/30/2020 at Unknown time   • omeprazole (priLOSEC) 20 MG capsule Take 20 mg by mouth Every Night.   1/30/2020 at Unknown time   • PARoxetine (PAXIL) 40 MG tablet Take 40 mg by mouth Every Night.   1/30/2020 at Unknown time   • HYDROcodone-acetaminophen (NORCO) 5-325 MG per tablet 1 tablet Every 6 (Six) Hours As Needed for Moderate Pain .   Taking   • naproxen (NAPROSYN) 500 MG tablet Take 500 mg by mouth Every 8 (Eight) Hours As Needed.   Taking   • topiramate (TOPAMAX) 50 MG tablet Take 50 mg by mouth Every Night.   Taking     Allergies:  Codeine    REVIEW OF SYSTEMS:  Please see the above history of present illness for pertinent positives and negatives.  The remainder of the patient's systems have been reviewed and are negative.    Vital Signs  Temp:  [97.7 °F (36.5 °C)-98 °F (36.7 °C)] 98 °F (36.7 °C)  Heart Rate:  [] 102  Resp:  [16] 16  BP: (103-126)/(66-75) 126/75  Oxygen Therapy  SpO2: 98 %  Pulse Oximetry Type: Intermittent  Device (Oxygen Therapy): room air}  Body mass index is 37.61 kg/m².     Flowsheet Rows      First Filed Value   Admission Height  165.1 cm (65\") Documented at 01/31/2020 1316   Admission Weight  103 kg (226 lb) Documented at 01/31/2020 1316             Physical Exam:  Physical Exam   Constitutional: Patient appears well-developed and well-nourished and in no acute distress.  Appears older than stated age.   HEENT:   Head: Normocephalic and atraumatic.   Eyes:  Pupils are equal, round, and reactive to light. EOM are intact. Sclerae are anicteric and noninjected.  Mouth and Throat: Patient has moist mucous membranes. Oropharynx is clear of any erythema or exudate.     Neck: Neck supple. No JVD present. No thyromegaly present. No lymphadenopathy present.  Cardiovascular: Regular rate, regular rhythm, S1 normal and S2 normal.  " Exam reveals no gallop and no friction rub.  No murmur heard.  Radial pulses are 2+ and symmetric.  Pulmonary/Chest: Lungs are diminished to auscultation bilaterally. No respiratory distress. No wheezes. No rhonchi. No rales.   Abdominal: Obese. Soft. Bowel sounds are diminished. No distension and no mass. There is no tenderness.   Musculoskeletal: Normal muscle tone  Extremities: No edema.   Neurological: Cranial nerves II-XII are grossly intact with no focal deficits.    Emotional Behavior:    Judgement and Insight: Average   Mental Status:  Alertness  Normal   Memory:  Appears intact.   Mood and Affect:         Depression  None               Anxiety  Noted    Debilities:   Physical Weakness  None   Handicaps  None   Disabilities  None   Agitation  None     Results Review:    I reviewed the patient's new clinical results.  Lab Results (most recent)     Procedure Component Value Units Date/Time    Basic Metabolic Panel [[id number]]  (Abnormal) Collected:  01/31/20 1637    Specimen:  Blood Updated:  01/31/20 1708     Glucose 209 mg/dL      BUN 6 mg/dL      Creatinine 0.70 mg/dL      Sodium 127 mmol/L      Potassium 3.7 mmol/L      Chloride 86 mmol/L      CO2 28.0 mmol/L      Calcium 9.2 mg/dL      eGFR Non African Amer 88 mL/min/1.73      BUN/Creatinine Ratio 8.6     Anion Gap 13.0 mmol/L     Narrative:       GFR Normal >60  Chronic Kidney Disease <60  Kidney Failure <15      CBC Auto Differential [[id number]]  (Abnormal) Collected:  01/31/20 1600    Specimen:  Blood Updated:  01/31/20 1606     WBC 9.30 10*3/mm3      RBC 4.01 10*6/mm3      Hemoglobin 12.1 g/dL      Hematocrit 34.8 %      MCV 86.8 fL      MCH 30.2 pg      MCHC 34.8 g/dL      RDW 14.2 %      RDW-SD 45.1 fl      MPV 9.8 fL      Platelets 188 10*3/mm3      Neutrophil % 78.8 %      Lymphocyte % 16.3 %      Monocyte % 2.9 %      Eosinophil % 1.3 %      Basophil % 0.3 %      Immature Grans % 0.4 %      Neutrophils, Absolute 7.32 10*3/mm3      Lymphocytes,  Absolute 1.52 10*3/mm3      Monocytes, Absolute 0.27 10*3/mm3      Eosinophils, Absolute 0.12 10*3/mm3      Basophils, Absolute 0.03 10*3/mm3      Immature Grans, Absolute 0.04 10*3/mm3      nRBC 0.0 /100 WBC           Imaging Results (Most Recent)     Procedure Component Value Units Date/Time    XR Chest PA & Lateral [[id number]] Resulted:  01/31/20 1629     Updated:  01/31/20 1630        reviewed    ECG/EMG Results (most recent)     Procedure Component Value Units Date/Time    ECG 12 Lead [[id number]] Collected:  01/31/20 1424     Updated:  01/31/20 1605    Narrative:       HEART RATE= 97  bpm  RR Interval= 620  ms  ID Interval= 139  ms  P Horizontal Axis= -42  deg  P Front Axis= 49  deg  QRSD Interval= 95  ms  QT Interval= 354  ms  QRS Axis= 51  deg  T Wave Axis= 62  deg  - NORMAL ECG -  Sinus rhythm  No change from prior tracing  Electronically Signed By: Juana Cortez ([facility]) 31-Jan-2020 16:05:14  Date and Time of Study: 2020-01-31 14:24:45        Reviewed and compared to 11/2016    Impression/Recommendations:    1.  Preoperative Risk Evaluation: She can perform 2-3 METs of activity w/o chest pain or dyspnea.  I see no dynamic change on her EKGs.  She is Revised Godman Class I.  No further cardiac testing.  However, her pre-op sodium is low.  She has no reported seizure activity, but she does describe pain and nausea today.  Sh is also on HCTZ which can complicate the picture.  I do not believe this is an acute change.  However, I'm going to start her on normal saline and repeat the BMP to better delineate this cause given that osm studies are send-outs.  Will discuss w/ Dr. Nunn and anesthesia.    2.  Hyperglycemia: Evening bedside glucose was 234.  No Hx/o Diabetes, and no history of hyperglycemia.  Checking A1c and will follow accuchecks.    3.  HTN: At goal.  Holding HCTZ.    4.  Hyponatremia: As in #1.    I discussed the patients findings and my recommendations with patient.     Brent Soto,  MD  01/31/20  7:12 PM

## 2020-02-01 NOTE — OP NOTE
Preoperative Diagnosis: Lateral malleolar fracture with syndesmotic disruption left ankle    Postoperative Diagnosis: Same    Procedure Performed: Open reduction total fixation of left bimalleolar fracture and syndesmotic disruption with variax 4-hole fibular plate and Arthrex tight rope x2    Surgeon: Edouard      Assistant: Edvin Lance    Anesthesia: Gen. with femoral sciatic nerve block    Tourniquet time 40 minutes      Anesthetist: Nabil Licea    Drains: None    Complications: None        Indications for procedure: 55-year-old female with a left lateral malleolar fracture and syndesmotic disruption of the left ankle          Operative Note: Patient brought to the operating room and after satisfactory anesthesia was obtained timeout completed identifying the patient procedure correctly.  A pneumonic terms placed around the left upper leg and the left leg was prepped and after the prep dry for 3 minutes was draped in sterile from usual manner timeout once again completed.  Leg was then exsanguinated tourniquet elevated to 250.  With the aid of the fluoroscope the procedure is carried out.  The lateral lesion fracture is exposed to the lateral incision via blunt sharp dissection care being taken not to injure lateral sensory nerves the lateral malleolar fracture was identified and subperiosteal dissection exposed the fracture proximally distally.  With the fracture reduced the 4-hole variax plate was contoured to the lateral aspect and fixation was carried out using 3.5 nonlocking screws proximally and 3.5 nonlocking screws distally.  Through the plate.  Reduction of the joint to Arthrex tight rope's were placed and tightened securing and reconstructing the syndesmotic joint preventing any subluxation and restored the fibular talar joint and realigning the complete.  The wound was irrigated again throughout the procedure from incision to closure with the irrisept solution.  1 g of vancomycin powder was placed in the  deep wound.  Deep tissue closure over the plate was carried out using interrupted 2-0 Vicryl.  Subcutaneous closure was completed using interrupted 2-0 Vicryl and subcuticular closure was completed using 3-0 strata fix.  Tourniquet was released prior to subcuticular closure.  A pernio Dermabond dressing was applied to the wound.  Sterile dressing was then applied the patient placed in a postop short leg splint with the ankle in neutral position.  She is then awakened taken to recovery room having tolerated the procedure well.  All counts were correct.            Dictated utilizing Dragon dictation

## 2020-02-01 NOTE — ANESTHESIA PROCEDURE NOTES
Airway  Urgency: elective    Date/Time: 2/1/2020 9:38 AM  Airway not difficult    General Information and Staff    Patient location during procedure: OR    Indications and Patient Condition  Indications for airway management: airway protection    Preoxygenated: yes  MILS maintained throughout  Mask difficulty assessment: 1 - vent by mask    Final Airway Details  Final airway type: supraglottic airway      Successful airway: unique  Size 4    Number of attempts at approach: 1  Assessment: lips, teeth, and gum same as pre-op and atraumatic intubation

## 2020-02-01 NOTE — PROGRESS NOTES
"Hospitalist Team      Patient Care Team:  Leni Bolden MD as PCP - General  Leni Bolden MD as PCP - Family Medicine        Chief Complaint:  Follow-up Newly diagnosed Diabetes    Subjective    Tolerated procedure well.  No further events from overnight.  Leg is still numb.  Denies chest pain and dyspnea.  Tolerating PO.    Objective    Vital Signs  Temp:  [96.9 °F (36.1 °C)-99 °F (37.2 °C)] 97.2 °F (36.2 °C)  Heart Rate:  [] 89  Resp:  [12-23] 18  BP: (102-180)/() 115/67  Oxygen Therapy  SpO2: 96 %  Pulse Oximetry Type: Continuous  Device (Oxygen Therapy): nasal cannula  Flow (L/min): 1  ETCO2 (mmHg): 47 mmHg  $ ETCO2 Daily Assessment (RT Only): yes}    Flowsheet Rows      First Filed Value   Admission Height  165.1 cm (65\") Documented at 01/31/2020 1316   Admission Weight  103 kg (226 lb) Documented at 01/31/2020 1316        Physical Exam:    General: Appears older than stated age in NAD  Lungs: Breath sounds are coarse.  No wheeze or rhonchi appreciated.  Normal excursion.  CV: Regular w/o murmur.  Radial pulses are 2+ and symmetric.  Abdomen: Obese, soft, and non-tender w/ active bowel sounds.  MSK: No C/C  Neuro: CN II-XII grossly intact  Psych: Pleasant affect.  AAOx3.    Results Review:     I reviewed the patient's new clinical results.    Lab Results (last 24 hours)     Procedure Component Value Units Date/Time    POC Glucose Once [[id number]]  (Abnormal) Collected:  02/01/20 1208    Specimen:  Blood Updated:  02/01/20 1215     Glucose 207 mg/dL     Basic Metabolic Panel [[id number]]  (Abnormal) Collected:  02/01/20 0612    Specimen:  Blood Updated:  02/01/20 0651     Glucose 185 mg/dL      BUN 9 mg/dL      Creatinine 0.68 mg/dL      Sodium 132 mmol/L      Potassium 4.2 mmol/L      Chloride 92 mmol/L      CO2 28.5 mmol/L      Calcium 9.4 mg/dL      eGFR Non African Amer 91 mL/min/1.73      BUN/Creatinine Ratio 13.2     Anion Gap 11.5 mmol/L     Narrative:       GFR Normal >60  Chronic " Kidney Disease <60  Kidney Failure <15      Urine Drug Screen - Urine, Clean Catch [[id number]]  (Abnormal) Collected:  02/01/20 0008    Specimen:  Urine, Clean Catch Updated:  02/01/20 0029     THC, Screen, Urine Negative     Phencyclidine (PCP), Urine Negative     Cocaine Screen, Urine Negative     Methamphetamine, Ur Negative     Opiate Screen Positive     Amphetamine Screen, Urine Negative     Benzodiazepine Screen, Urine Negative     Tricyclic Antidepressants Screen Positive     Methadone Screen, Urine Negative     Barbiturates Screen, Urine Negative     Oxycodone Screen, Urine Negative     Propoxyphene Screen Negative     Buprenorphine, Screen, Urine Negative    Narrative:       Urine drug screen results are to be used for medical purposes only.  They are not to be used for legal purposes such as employment testing.  Negative results do not necessarily mean the complete absence of a subtance, but rather that the result is less than the cutoff for that substance.  Positive results are unconfirmed and considered Preliminary Positive.  [facility] does not automatically confirm Postitive Unconfirmed results.  The physician may request (order) an Unconfirmed Positive result to be sent out for confirmation.      Negative Thresholds for Drugs Screened:    THC screen, urine                          50 ng/ml  Phenycyclidine (PCP), urine                25 ng/ml  Cocaine screen, urine                     150 ng/ml  Methamphetamine, urine                    500 ng/ml  Opiate screen, urine                      100 ng/ml  Amphetamine screen, urine                 500 ng/ml  Benzodiazepine screen, urine              150 ng/ml  Tricyclic Antidepressants screen, urine   300 ng/ml  Methadone screen, urine                   200 ng/ml  Barbiturates screen, urine                200 ng/ml  Oxycodone screen, urine                   100 ng/ml  Propoxyphene screen, urine                300 ng/ml  Buprenorphine screen,  urine                10 ng/ml    Blood Gas, Arterial [[id number]]  (Abnormal) Collected:  01/31/20 2334    Specimen:  Arterial Blood Updated:  01/31/20 2338     Site Left Radial     Jesus's Test Positive     pH, Arterial 7.351 pH units      pCO2, Arterial 53.6 mm Hg      Comment: 83 Value above reference range        pO2, Arterial 88.0 mm Hg      HCO3, Arterial 29.6 mmol/L      Comment: 83 Value above reference range        Base Excess, Arterial 2.9 mmol/L      Comment: 83 Value above reference range        O2 Saturation, Arterial 97.3 %      Hemoglobin, Blood Gas 12.6 g/dL      Temperature 37.0 C      Barometric Pressure for Blood Gas 741 mmHg      Modality Nasal Cannula     Flow Rate 3.0 lpm      Ventilator Mode NA     Comment: Meter: [id number]     :  [id number]        pCO2, Temperature Corrected 53.6 mm Hg      pH, Temp Corrected 7.351 pH Units      pO2, Temperature Corrected 88.0 mm Hg     Basic Metabolic Panel [[id number]]  (Abnormal) Collected:  01/31/20 2302    Specimen:  Blood Updated:  01/31/20 2326     Glucose 196 mg/dL      BUN 8 mg/dL      Creatinine 0.75 mg/dL      Sodium 123 mmol/L      Potassium 3.7 mmol/L      Chloride 83 mmol/L      CO2 29.4 mmol/L      Calcium 9.1 mg/dL      eGFR Non African Amer 81 mL/min/1.73      BUN/Creatinine Ratio 10.7     Anion Gap 10.6 mmol/L     Narrative:       GFR Normal >60  Chronic Kidney Disease <60  Kidney Failure <15      Blood Gas, Venous [[id number]]  (Abnormal) Collected:  01/31/20 2316    Specimen:  Venous Blood Updated:  01/31/20 2323     Site OTHER     pH, Venous 7.319 pH Units      Comment: 84 Value below reference range        pCO2, Venous 62.2 mm Hg      Comment: 83 Value above reference range        pO2, Venous 24.9 mm Hg      Comment: 84 Value below reference range        HCO3, Venous 31.9 mmol/L      Comment: 83 Value above reference range        Base Excess, Venous 4.0 mmol/L      Comment: 83 Value above reference range        O2 Saturation,  Venous 37.1 %      Comment: 84 Value below reference range        Hemoglobin, Blood Gas 13.1 g/dL      Temperature 37.0 C      Barometric Pressure for Blood Gas 741 mmHg      Modality Nasal Cannula     Flow Rate 3.0 lpm      Ventilator Mode NA     Comment: Meter: [id number]     :  [id number]        Notified Who [email or web address]     Notified By [id number]     Notified Time 01/31/2020 23:32    POC Glucose Once [[id number]]  (Abnormal) Collected:  01/31/20 2118    Specimen:  Blood Updated:  01/31/20 2127     Glucose 258 mg/dL     Hemoglobin A1c [[id number]]  (Abnormal) Collected:  01/31/20 1600    Specimen:  Blood Updated:  01/31/20 1955     Hemoglobin A1C 7.60 %     Narrative:       Hemoglobin A1C Ranges:    Increased Risk for Diabetes  5.7% to 6.4%  Diabetes                     >= 6.5%  Diabetic Goal                < 7.0%    POC Glucose Once [[id number]]  (Abnormal) Collected:  01/31/20 1924    Specimen:  Blood Updated:  01/31/20 1930     Glucose 234 mg/dL     Basic Metabolic Panel [[id number]]  (Abnormal) Collected:  01/31/20 1637    Specimen:  Blood Updated:  01/31/20 1708     Glucose 209 mg/dL      BUN 6 mg/dL      Creatinine 0.70 mg/dL      Sodium 127 mmol/L      Potassium 3.7 mmol/L      Chloride 86 mmol/L      CO2 28.0 mmol/L      Calcium 9.2 mg/dL      eGFR Non African Amer 88 mL/min/1.73      BUN/Creatinine Ratio 8.6     Anion Gap 13.0 mmol/L     Narrative:       GFR Normal >60  Chronic Kidney Disease <60  Kidney Failure <15            Imaging Results (Last 24 Hours)     Procedure Component Value Units Date/Time    FL C Arm During Surgery [[id number]] Resulted:  02/01/20 1111     Updated:  02/01/20 1112    XR Ankle 2 View Left [[id number]] Resulted:  02/01/20 1107     Updated:  02/01/20 1110            Medication Review:   I have reviewed the patient's current medication list    Current Facility-Administered Medications:   •  [START ON 2/2/2020] aspirin tablet 325 mg, 325 mg, Oral, Q12H, Cliff Nunn MD  •   ceFAZolin Sodium-Dextrose (ANCEF) IVPB (duplex) 2 g, 2 g, Intravenous, Q8H, Cliff Nunn MD  •  dextrose (D50W) 25 g/ 50mL Intravenous Solution 25 g, 25 g, Intravenous, Q15 Min PRN, Cliff Nunn MD  •  dextrose (GLUTOSE) oral gel 15 g, 15 g, Oral, Q15 Min PRN, Cliff Nunn MD  •  docusate sodium (COLACE) capsule 100 mg, 100 mg, Oral, Daily, Cliff Nunn MD, 100 mg at 01/31/20 1740  •  glucagon (GLUCAGEN) injection 1 mg, 1 mg, Subcutaneous, Q15 Min PRN, Cliff Nunn MD  •  insulin aspart (novoLOG) injection 0-7 Units, 0-7 Units, Subcutaneous, 4x Daily AC & at Bedtime, Cliff Nunn MD, 3 Units at 02/01/20 1227  •  ketorolac (TORADOL) injection 30 mg, 30 mg, Intravenous, Q6H PRN, Cliff Nunn MD, 30 mg at 02/01/20 0515  •  lactated ringers infusion, 9 mL/hr, Intravenous, Continuous, Cliff Nunn MD, Last Rate: 9 mL/hr at 02/01/20 0915, 9 mL/hr at 02/01/20 0915  •  lactated ringers infusion, 100 mL/hr, Intravenous, Continuous, Jesus Licea, CRNA, Stopped at 02/01/20 1350  •  lactated ringers infusion, 30 mL/hr, Intravenous, Continuous, Cliff Nunn MD  •  metFORMIN (GLUCOPHAGE) tablet 500 mg, 500 mg, Oral, BID With Meals, Cliff Nunn MD  •  nicotine (NICODERM CQ) 21 MG/24HR patch 1 patch, 1 patch, Transdermal, Q24H, Cliff Nunn MD, 1 patch at 02/01/20 1225  •  ondansetron (ZOFRAN) injection 4 mg, 4 mg, Intravenous, Q6H PRN, Cliff Nunn MD  •  oxyCODONE-acetaminophen (PERCOCET) 7.5-325 MG per tablet 2 tablet, 2 tablet, Oral, Q4H PRN, Cliff Nunn MD  •  pantoprazole (PROTONIX) EC tablet 40 mg, 40 mg, Oral, QAM, Cliff Nunn MD  •  PARoxetine (PAXIL) tablet 40 mg, 40 mg, Oral, Nightly, Cliff Nunn MD  •  sodium chloride 0.9 % flush 10 mL, 10 mL, Intravenous, PRN, Cliff Nunn MD  •  sodium chloride 0.9 % flush 10 mL, 10 mL, Intravenous, Q12H, Cliff Nunn MD, 10 mL at 01/31/20 1742  •  sodium chloride 0.9 % infusion 40 mL, 40 mL, Intravenous, PRN, Cliff Nunn MD  •   sodium chloride 0.9 % infusion, 75 mL/hr, Intravenous, Continuous, Cliff Nunn MD, Last Rate: 75 mL/hr at 02/01/20 0717  •  vancomycin 1500 mg/250 mL 0.9% NS IVPB, 15 mg/kg, Intravenous, Once, Cliff Nunn MD      Impression/Recommendations:    1.  New Diabetes Mellitus, Type 2: Started on Metformin.  Continue to monitor bedside glucose measurements.  Check TSH.  Will need to see diabetic educator prior to discharge.    2.  Hyponatremia: Suspect this is due to SIADH.  Demonstrated by drop in sodium and response to normal saline.  Corrected with hypertonic saline.  SIADH response is multifactorial.  Continue to monitor.    3.  Hypertension: Remains at goal.  Continue to hold hydrochlorothiazide.    Plan for disposition: As per Dr. Edouard Soto MD  02/01/20  4:48 PM

## 2020-02-01 NOTE — ANESTHESIA POSTPROCEDURE EVALUATION
Patient: Marilyn Gottlieb    Procedure Summary     Date:  02/01/20 Room / Location:   [facility] 3 /  [facility]    Anesthesia Start:  0931 Anesthesia Stop:  1101    Procedure:  ANKLE OPEN REDUCTION INTERNAL FIXATION-with syndesmotic tight rope.  Lateral malleolus. (Left Ankle) Diagnosis:       Closed displaced fracture of lateral malleolus of left fibula, initial encounter      Syndesmotic disruption of left ankle, initial encounter      (Closed displaced fracture of lateral malleolus of left fibula, initial encounter [S82.62XA])      (Syndesmotic disruption of left ankle, initial encounter [S93.432A])    Surgeon:  Cliff Nunn MD Provider:  Jesus Licea CRNA    Anesthesia Type:  general with block ASA Status:  3          Anesthesia Type: general with block    Vitals  Vitals Value Taken Time   /62 2/1/2020 11:10 AM   Temp 98.5 °F (36.9 °C) 2/1/2020 10:57 AM   Pulse 99 2/1/2020 11:19 AM   Resp 12 2/1/2020 10:57 AM   SpO2 93 % 2/1/2020 11:19 AM   Vitals shown include unvalidated device data.        Post Anesthesia Care and Evaluation    Patient location during evaluation: PACU  Patient participation: complete - patient participated  Level of consciousness: awake and alert  Pain score: 0  Pain management: adequate  Airway patency: patent  Anesthetic complications: No anesthetic complications  PONV Status: none  Cardiovascular status: acceptable  Respiratory status: acceptable  Hydration status: acceptable

## 2020-02-01 NOTE — NURSING NOTE
Notified Dr. Martinez of patients CO2 level remaining elevated following RR (per Dr. Nunn request) and that patient is now alert, note new stat orders obtained. Also to hold Elevil and Topamax tonight.

## 2020-02-01 NOTE — ANESTHESIA PROCEDURE NOTES
Peripheral Block      Patient reassessed immediately prior to procedure    Patient location during procedure: pre-op  Start time: 2/1/2020 9:13 AM  Stop time: 2/1/2020 9:16 AM  Reason for block: post-op pain management  Preanesthetic Checklist  Completed: patient identified, site marked, surgical consent, pre-op evaluation, timeout performed, IV checked, risks and benefits discussed and monitors and equipment checked  Prep:  Pt Position: right lateral decubitus  Sterile barriers:cap, gloves and mask  Prep: ChloraPrep  Patient monitoring: blood pressure monitoring, continuous pulse oximetry and EKG  Procedure  Sedation:yes  Performed under: local infiltration  Guidance:nerve stimulator  Images:still images not obtained    Laterality:left  Block Type:sciatic  Injection Technique:single-shot  Needle Type:short-bevel and echogenic  Needle Gauge:22 G  Resistance on Injection: none    Medications Used: ropivacaine (NAROPIN) injection 0.5 %, 30 mL  Med admintered at 2/1/2020 9:15 AM      Post Assessment  Injection Assessment: negative aspiration for heme, no paresthesia on injection and incremental injection  Patient Tolerance:comfortable throughout block  Complications:no

## 2020-02-01 NOTE — NURSING NOTE
Notified Dr. Nunn that Rapid Response was called on patient R/T increase in CO2 and requirement for oxygen. Narcan was given and was effective. Also informed that a bottle labeled Hydrocodone/Acetaminophen was found on bedside but Patient states did not take any while in the hospital. Med now with staff. Note new order obtained.

## 2020-02-01 NOTE — PLAN OF CARE
Problem: Patient Care Overview  Goal: Plan of Care Review  Flowsheets (Taken 2/1/2020 1308)  Plan of Care Reviewed With: patient  Outcome Summary: PT note: Intial evaluation completed this am. Patient s/p ORIF left lateral malleolus fracture this am. Patient was able to transfer supine to sit with minimal assist with left leg and use of bed rail and HOB elevated. She was able to transfer sit to stand with FWW, NWB left leg with minimal assist of 2. She was able to stand for 3-4 minutes and maintain NWB left leg well. No gait attempted yet secondary to patient having some dizziness secondary to recent surgery/anesthesia.  Will follow patient 1-2 times a day while she is in hospital for skilled PT services for functional mobility/transfer/gait training, NWB left leg.  Will continue to assess for appropriate AD- walker vs. crutches. She will benefit from stair training as well. Plan is for patient to go home with family upon discharge.

## 2020-02-02 LAB
ANION GAP SERPL CALCULATED.3IONS-SCNC: 10.2 MMOL/L (ref 5–15)
BUN BLD-MCNC: 10 MG/DL (ref 6–20)
BUN/CREAT SERPL: 16.4 (ref 7–25)
CALCIUM SPEC-SCNC: 9.4 MG/DL (ref 8.6–10.5)
CHLORIDE SERPL-SCNC: 97 MMOL/L (ref 98–107)
CO2 SERPL-SCNC: 26.8 MMOL/L (ref 22–29)
CREAT BLD-MCNC: 0.61 MG/DL (ref 0.57–1)
GFR SERPL CREATININE-BSD FRML MDRD: 103 ML/MIN/1.73
GLUCOSE BLD-MCNC: 142 MG/DL (ref 65–99)
GLUCOSE BLDC GLUCOMTR-MCNC: 129 MG/DL (ref 70–130)
GLUCOSE BLDC GLUCOMTR-MCNC: 148 MG/DL (ref 70–130)
GLUCOSE BLDC GLUCOMTR-MCNC: 156 MG/DL (ref 70–130)
GLUCOSE BLDC GLUCOMTR-MCNC: 214 MG/DL (ref 70–130)
POTASSIUM BLD-SCNC: 4 MMOL/L (ref 3.5–5.2)
SODIUM BLD-SCNC: 134 MMOL/L (ref 136–145)

## 2020-02-02 PROCEDURE — 94799 UNLISTED PULMONARY SVC/PX: CPT

## 2020-02-02 PROCEDURE — 97165 OT EVAL LOW COMPLEX 30 MIN: CPT

## 2020-02-02 PROCEDURE — 25010000002 KETOROLAC TROMETHAMINE PER 15 MG: Performed by: ORTHOPAEDIC SURGERY

## 2020-02-02 PROCEDURE — 82962 GLUCOSE BLOOD TEST: CPT

## 2020-02-02 PROCEDURE — G0378 HOSPITAL OBSERVATION PER HR: HCPCS

## 2020-02-02 PROCEDURE — 94770: CPT

## 2020-02-02 PROCEDURE — 63710000001 INSULIN ASPART PER 5 UNITS: Performed by: ORTHOPAEDIC SURGERY

## 2020-02-02 PROCEDURE — 97116 GAIT TRAINING THERAPY: CPT

## 2020-02-02 PROCEDURE — 25010000003 CEFAZOLIN SODIUM-DEXTROSE 2-3 GM-%(50ML) RECONSTITUTED SOLUTION: Performed by: ORTHOPAEDIC SURGERY

## 2020-02-02 PROCEDURE — 97110 THERAPEUTIC EXERCISES: CPT

## 2020-02-02 PROCEDURE — 80048 BASIC METABOLIC PNL TOTAL CA: CPT | Performed by: HOSPITALIST

## 2020-02-02 PROCEDURE — 99024 POSTOP FOLLOW-UP VISIT: CPT | Performed by: ORTHOPAEDIC SURGERY

## 2020-02-02 RX ORDER — ATORVASTATIN CALCIUM 10 MG/1
10 TABLET, FILM COATED ORAL NIGHTLY
Status: DISCONTINUED | OUTPATIENT
Start: 2020-02-02 | End: 2020-02-03 | Stop reason: HOSPADM

## 2020-02-02 RX ORDER — LISINOPRIL 10 MG/1
10 TABLET ORAL
Status: DISCONTINUED | OUTPATIENT
Start: 2020-02-02 | End: 2020-02-03 | Stop reason: HOSPADM

## 2020-02-02 RX ADMIN — PAROXETINE HYDROCHLORIDE 40 MG: 20 TABLET, FILM COATED ORAL at 20:17

## 2020-02-02 RX ADMIN — INSULIN ASPART 2 UNITS: 100 INJECTION, SOLUTION INTRAVENOUS; SUBCUTANEOUS at 08:58

## 2020-02-02 RX ADMIN — CEFAZOLIN SODIUM 2 G: 2 SOLUTION INTRAVENOUS at 11:44

## 2020-02-02 RX ADMIN — OXYCODONE HYDROCHLORIDE AND ACETAMINOPHEN 2 TABLET: 7.5; 325 TABLET ORAL at 15:28

## 2020-02-02 RX ADMIN — ATORVASTATIN CALCIUM 10 MG: 10 TABLET, FILM COATED ORAL at 20:17

## 2020-02-02 RX ADMIN — INSULIN ASPART 3 UNITS: 100 INJECTION, SOLUTION INTRAVENOUS; SUBCUTANEOUS at 16:40

## 2020-02-02 RX ADMIN — CEFAZOLIN SODIUM 2 G: 2 SOLUTION INTRAVENOUS at 06:18

## 2020-02-02 RX ADMIN — METFORMIN HYDROCHLORIDE 500 MG: 500 TABLET ORAL at 17:25

## 2020-02-02 RX ADMIN — ASPIRIN 325 MG: 325 TABLET ORAL at 20:17

## 2020-02-02 RX ADMIN — KETOROLAC TROMETHAMINE 30 MG: 30 INJECTION, SOLUTION INTRAMUSCULAR at 20:17

## 2020-02-02 RX ADMIN — METFORMIN HYDROCHLORIDE 500 MG: 500 TABLET ORAL at 08:58

## 2020-02-02 RX ADMIN — KETOROLAC TROMETHAMINE 30 MG: 30 INJECTION, SOLUTION INTRAMUSCULAR at 07:02

## 2020-02-02 RX ADMIN — ASPIRIN 325 MG: 325 TABLET ORAL at 08:58

## 2020-02-02 RX ADMIN — KETOROLAC TROMETHAMINE 30 MG: 30 INJECTION, SOLUTION INTRAMUSCULAR at 13:19

## 2020-02-02 RX ADMIN — NICOTINE 1 PATCH: 21 PATCH, EXTENDED RELEASE TRANSDERMAL at 08:58

## 2020-02-02 RX ADMIN — PANTOPRAZOLE SODIUM 40 MG: 40 TABLET, DELAYED RELEASE ORAL at 06:24

## 2020-02-02 RX ADMIN — OXYCODONE HYDROCHLORIDE AND ACETAMINOPHEN 2 TABLET: 7.5; 325 TABLET ORAL at 21:32

## 2020-02-02 RX ADMIN — OXYCODONE HYDROCHLORIDE AND ACETAMINOPHEN 2 TABLET: 7.5; 325 TABLET ORAL at 10:18

## 2020-02-02 RX ADMIN — SODIUM CHLORIDE, PRESERVATIVE FREE 10 ML: 5 INJECTION INTRAVENOUS at 20:20

## 2020-02-02 RX ADMIN — SODIUM CHLORIDE, PRESERVATIVE FREE 10 ML: 5 INJECTION INTRAVENOUS at 08:59

## 2020-02-02 RX ADMIN — DOCUSATE SODIUM 100 MG: 100 CAPSULE, LIQUID FILLED ORAL at 08:58

## 2020-02-02 RX ADMIN — LISINOPRIL 10 MG: 10 TABLET ORAL at 15:27

## 2020-02-02 NOTE — PLAN OF CARE
Problem: Patient Care Overview  Goal: Plan of Care Review  Outcome: Ongoing (interventions implemented as appropriate)  Flowsheets  Taken 2/2/2020 0812 by Genesis Choudhury PT  Progress: improving  Taken 2/2/2020 6457 by Kira Paige RN  Plan of Care Reviewed With: patient  Outcome Summary: LLE drsg intact,nwb up with aid of walker and gaitbelt. pain controlled with po percocet and iv toradol.

## 2020-02-02 NOTE — THERAPY DISCHARGE NOTE
"Acute Care - Occupational Therapy Initial Eval/Discharge   Marylou Esparza     Patient Name: Marilyn Gottlieb  : 1965  MRN: [id number]  Today's Date: 2020  Onset of Illness/Injury or Date of Surgery: 20  Date of Referral to OT: 20  Referring Physician: Dr Nunn      Admit Date: 2020       ICD-10-CM ICD-9-CM   1. Closed displaced fracture of lateral malleolus of left fibula, initial encounter S82.62XA 824.2   2. Syndesmotic disruption of left ankle, initial encounter S93.432A 845.03     Patient Active Problem List   Diagnosis   • Closed displaced fracture of lateral malleolus of left fibula   • Syndesmotic disruption of left ankle   • Lateral malleolar fracture     Past Medical History:   Diagnosis Date   • Acid reflux    • Anxiety    • Depression    • Eczema     ON TRUNK, BILATERAL ARMS, PATIENT SCRATCHES   • High cholesterol     SUPPOSED TO BE ON MEDICATIONS   • Hypertension    • Migraine     TAKES TOPAMAX   • MRSA infection     LEFT AXILLA    • Panic attacks      Past Surgical History:   Procedure Laterality Date   • CHOLECYSTECTOMY N/A 2016    Procedure: CHOLECYSTECTOMY LAPAROSCOPIC;  Surgeon: Clemente Meeks MD;  Location: [facility];  Service:    • HYSTERECTOMY      ABDOMINAL   • ROTATOR CUFF REPAIR     • TUBAL ABDOMINAL LIGATION            OT ASSESSMENT FLOWSHEET (last 12 hours)      Occupational Therapy Evaluation     Row Name 20 0900                   OT Evaluation Time/Intention    Subjective Information  complains of;pain  -JJ        Document Type  evaluation  -JJ        Mode of Treatment  occupational therapy  -JJ        Patient Effort  good  -JJ        Symptoms Noted During/After Treatment  -- pt co being \"shaky\" from medicine  -JJ           General Information    Patient Profile Reviewed?  yes  -JJ        Onset of Illness/Injury or Date of Surgery  20  -JJ        Referring Physician  Dr Nunn  -J        Patient Observations  alert;cooperative;agree to therapy  " -JJ        General Observations of Patient  pt supine in bed, agreed to evaluation  -JJ        Prior Level of Function  independent:;all household mobility;ADL's;home management;transfer  -JJ        Equipment Currently Used at Home  none  -JJ        Pertinent History of Current Functional Problem  pt admitted to hospital on 1/31/2020 after sustaining fall at home. pt diagnosed with displaced L lateral malleolar fracture with syndesmotic disruption. pt s/p ORIF L ankle with syndesmotic tight rope on 2/1/2020. pt states she also sprained her right ankle in the fall and was having difficulty maintaining NWB with crutches at home prior to sx.   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  -JJ           Resource/Environmental Concerns    Current Living Arrangements  home/apartment/condo  -JJ           Home Main Entrance    Number of Stairs, Main Entrance  three  -JJ        Stair Railings, Main Entrance  railings on both sides of stairs  -JJ        Stairs Comment, Main Entrance  pt states her spouse is building a ramp at her home today  -JJ           Cognitive Assessment/Interventions    Additional Documentation  Cognitive Assessment/Intervention (Group)  -JJ           Cognitive Assessment/Intervention- PT/OT    Orientation Status (Cognition)  oriented x 4  -JJ        Follows Commands (Cognition)  WFL  -JJ        Personal Safety Interventions  gait belt;nonskid shoes/slippers when out of bed  -JJ           Mobility Assessment/Treatment    Extremity Weight-bearing Status  left lower extremity  -JJ        Left Lower Extremity (Weight-bearing Status)  non  weight-bearing (NWB)  -JJ           Bed Mobility Assessment/Treatment    Supine-Sit [place] (Bed Mobility)  minimum assist (75% patient effort) assist with L LE  -JJ        Bed Mobility, Safety Issues  decreased use of legs for bridging/pushing  -JJ        Assistive Device (Bed Mobility)  bed rails;head of bed elevated  -J           Functional Mobility    Functional Mobility- Ind. Level  contact guard assist;verbal cues required;2 person assist required  -JJ        Functional Mobility- Device  rolling walker  -JJ        Functional Mobility-Distance (Feet)  10  -JJ        Functional Mobility- Comment  pt with co fatigue and pain with mobility  -JJ           Transfer Assessment/Treatment    Transfer Assessment/Treatment  sit-stand transfer;stand-sit transfer  -JJ        Maintains Weight-bearing Status (Transfers)  able to maintain  -JJ        Comment (Transfers)  pt able to transfer sit to stand from elevated bed surface with SBA and RW  -JJ           Sit-Stand Transfer    Sit-Stand [place] (Transfers)  stand by assist  -JJ        Assistive Device (Sit-Stand Transfers)  walker, front-wheeled  -JJ           Stand-Sit Transfer    Stand-Sit [place] (Transfers)  stand by assist  -JJ        Assistive Device (Stand-Sit Transfers)  walker, front-wheeled  -JJ           Bathing Assessment/Intervention    Bathing [place] Level  minimum assist (75% patient effort);lower body  -JJ           Lower Body Dressing Assessment/Training    Lower Body Dressing [place] Level  lower body dressing skills;minimum assist (75% patient effort)  -JJ           General ROM    GENERAL ROM COMMENTS  B UE AROM WFL  -JJ           MMT (Manual Muscle Testing)    General MMT Comments  B UE MMT WFL  -JJ           Positioning and Restraints    Pre-Treatment Position  in bed  -JJ        Post Treatment Position  chair  -JJ        In Chair  reclined;call light within reach;encouraged to call for assist  -JJ           Pain Scale:  Numbers Pre/Post-Treatment    Pre/Post Treatment Pain Comment  pt co pain in L ankle at rest, did not rate however states RN just gave her pain medication.  -JJ        Pain Intervention(s)  Medication (See MAR);Repositioned  -JJ           Wound 02/01/20 0958 Left lateral ankle Incision    Wound - Properties Group Date first assessed: 02/01/20  - Time first assessed: 0958  - Side: Left  - Orientation: lateral  - Location: ankle  -JM Primary Wound Type: Incision  -JM       Plan of Care Review    Plan of Care Reviewed With  patient  -JJ        Outcome Summary  OT evaluation completed. pt required min assist for bed mobility and SBA for functional transfers from elevated bed surface with RW. pt required CGA for functional mobility x 10 feet with RW. pt able to maintain NWB of L LE throughout mobility however co increased fatigue and pain. anticipate pt to require min assist for lb adsl. pt states spouse will be ther to assist her if needed and she will perform sink side baths while having to adhere to NWB. pt provided with reacher for Lb adls and home safety. pt states spouse is building a ramp today for her to enter home. No further skilled OT recommendations at this time.  -JJ           Clinical Impression (OT)    Date of Referral to OT  02/01/20  -JJ        OT Diagnosis  decreased adl sp ankle fracture  -JJ        Prognosis (OT Eval)  good  -JJ        Patient/Family Goals Statement (OT Eval)  pt states plan is to return home with assist from family  -JJ        Criteria for Skilled Therapeutic Interventions Met (OT Eval)  no significant expected improvement in functional status;no  -JJ        Therapy Frequency (OT Eval)  evaluation only  -JJ        Care Plan Review (OT)  evaluation/treatment results reviewed;care plan/treatment goals reviewed;risks/benefits reviewed  -JJ        Anticipated Discharge Disposition (OT)  home with assist  -JJ           Living Environment    Home Accessibility  stairs to enter home   -JJ          User Key  (r) = Recorded By, (t) = Taken By, (c) = Cosigned By    Initials Name Effective Dates    Kary Munguia, OTR 06/22/16 -     Divine Boo RN 06/06/18 -               OT Recommendation and Plan  Outcome Summary/Treatment Plan (OT)  Anticipated Discharge Disposition (OT): home with assist  Therapy Frequency (OT Eval): evaluation only  Plan of Care Review  Plan of Care Reviewed With: patient  Plan of Care Reviewed With: patient  Outcome Summary: OT evaluation completed. pt required min assist for bed mobility and SBA for functional transfers from elevated bed surface with RW. pt required CGA for functional mobility x 10 feet with RW. pt able to maintain NWB of L LE throughout mobility however co increased fatigue and pain. anticipate pt to require min assist for lb adsl. pt states spouse will be ther to assist her if needed and she will perform sink side baths while having to adhere to NWB. pt provided with reacher for Lb adls and home safety. pt states spouse is building a ramp today for her to enter home. No further skilled OT recommendations at this time.         Outcome Measures     Row Name 02/02/20 0900 02/01/20 1307          How much help from another person do you currently need...    Turning from your back to your side while in flat bed without using bedrails?  --  3  -LN     Moving from lying on back to sitting on the side of a flat bed without bedrails?  --  3  -LN     Moving to and from a bed to a chair (including a wheelchair)?  --  2  -LN     Standing up from a chair using your arms (e.g., wheelchair, bedside chair)?  --  2  -LN     Climbing 3-5 steps with a railing?  --  2  -LN     To walk in hospital room?  --  2  -LN     AM-PAC 6 Clicks Score (PT)  --  14  -LN        How much help from another is currently needed...    Putting on and taking off regular lower body clothing?  3  -JJ  --     Bathing (including washing, rinsing, and drying)  3  -JJ  --     Toileting  (which includes using toilet bed pan or urinal)  3  -JJ  --     Putting on and taking off regular upper body clothing  4  -JJ  --     Taking care of personal grooming (such as brushing teeth)  4  -JJ  --     Eating meals  4  -JJ  --     AM-PAC 6 Clicks Score (OT)  21  -JJ  --        Functional Assessment    Outcome Measure Options  AM-PAC 6 Clicks Daily Activity (OT)  -JJ  AM-PAC 6 Clicks Basic Mobility (PT)  -LN       User Key  (r) = Recorded By, (t) = Taken By, (c) = Cosigned By    Initials Name Provider Type    Kary Munguia, OTR Occupational Therapist    Genesis Anderson, PT Physical Therapist          Time Calculation:   Time Calculation- OT     Row Name 02/02/20 0959             Time Calculation- OT    OT Start Time  0900  -        User Key  (r) = Recorded By, (t) = Taken By, (c) = Cosigned By    Initials Name Provider Type    Kary Munguia OTR Occupational Therapist        Therapy Suggested Charges     Code   Minutes Charges    None           Therapy Charges for Today     Code Description Service Date Service Provider Modifiers Qty    [id number] HC OT EVAL LOW COMPLEXITY 2 2/2/2020 Kary Gutiérrez OTR GO 1               OT Discharge Summary  Anticipated Discharge Disposition (OT): home with assist    JEAN PIERRE Max  2/2/2020

## 2020-02-02 NOTE — PLAN OF CARE
Problem: Patient Care Overview  Goal: Plan of Care Review  Outcome: Ongoing (interventions implemented as appropriate)  Flowsheets  Taken 2/1/2020 1533 by Kria Paige, RN  Progress: improving  Taken 2/2/2020 0566 by Fe Diaz RN  Plan of Care Reviewed With: patient  Note:   Pt POD#1 still no pain beginning to move toes but still numb. Continues with ambulating as non-wt bearing.  Diabbetes education ongoing. Pt was educated on administering insulin when necessary. She administered her insulin herself. Continue to encourage.

## 2020-02-02 NOTE — NURSING NOTE
Continued Stay Note  ORLANDO Goode     Patient Name: Marilyn Gottlieb  MRN: [id number]  Today's Date: 2/2/2020    Admit Date: 1/31/2020    Discharge Plan     Row Name 02/02/20 1320       Plan    Plan  plan home with family, assess needs    Plan Comments  Follow up visit with patient. Per PT patient will need wc, rw & bsc at dc. Discussed that insurance will cover bsc and 1 ambulatory device. Patient states her  has obtained a rw. CM will get order for wc & bsc in am and have DME delivered prior to dc. Anticipate dc tomorrow.  Patient agreeable to plan, will continue to follow.        Discharge Codes    No documentation.             Mumtaz Cao RN

## 2020-02-02 NOTE — THERAPY TREATMENT NOTE
Acute Care - Physical Therapy Treatment Note   Marylou Esparza     Patient Name: Marilyn Gottlieb  : 1965  MRN: [id number]  Today's Date: 2020  Onset of Illness/Injury or Date of Surgery: 20  Date of Referral to PT: 20  Referring Physician: Dr Nunn    Admit Date: 2020    Visit Dx:    ICD-10-CM ICD-9-CM   1. Closed displaced fracture of lateral malleolus of left fibula, initial encounter S82.62XA 824.2   2. Syndesmotic disruption of left ankle, initial encounter S93.432A 845.03     Patient Active Problem List   Diagnosis   • Closed displaced fracture of lateral malleolus of left fibula   • Syndesmotic disruption of left ankle   • Lateral malleolar fracture       Therapy Treatment    Rehabilitation Treatment Summary     Row Name 20 0855             Treatment Time/Intention    Discipline  physical therapist  -LN      Document Type  therapy note (daily note)  -LN      Subjective Information  complains of;pain  -LN      Mode of Treatment  physical therapy  -LN      Patient Effort  good  -LN      Existing Precautions/Restrictions  fall;non-weight bearing left LE  -LN      Treatment Considerations/Comments  Patient reports that her  and a friend are building her a ramp today so she will not have to do steps to enter her home.   -LN      Recorded by [LN] Genesis Choudhury, PT 20 1147      Row Name 20 0855             Cognitive Assessment/Intervention- PT/OT    Personal Safety Interventions  gait belt;nonskid shoes/slippers when out of bed;supervised activity  -LN      Recorded by [LN] Genesis Choudhury, PT 20 1147      Row Name 20 0855             Mobility Assessment/Intervention    Extremity Weight-bearing Status  left lower extremity  -LN      Left Lower Extremity (Weight-bearing Status)  non weight-bearing (NWB)  -LN      Recorded by [LN] Genesis Choudhury, PT 20 1147      Row Name 20 0855             Bed Mobility Assessment/Treatment     Bed Mobility Assessment/Treatment  supine-sit  -LN      Supine-Sit Cross (Bed Mobility)  minimum assist (75% patient effort) assist with left leg only  -LN      Bed Mobility, Safety Issues  decreased use of legs for bridging/pushing  -LN      Assistive Device (Bed Mobility)  bed rails;head of bed elevated  -LN      Recorded by [LN] Genesis Choudhury, PT 02/02/20 1147      Row Name 02/02/20 0855             Transfer Assessment/Treatment    Transfer Assessment/Treatment  sit-stand transfer;stand-sit transfer  -LN      Maintains Weight-bearing Status (Transfers)  able to maintain  -LN      Recorded by [LN] Genesis Choudhury, PT 02/02/20 1147      Row Name 02/02/20 0855             Sit-Stand Transfer    Sit-Stand Cross (Transfers)  stand by assist  -LN      Assistive Device (Sit-Stand Transfers)  walker, front-wheeled  -LN      Recorded by [LN] Genesis Choudhury, PT 02/02/20 1147      Row Name 02/02/20 0855             Stand-Sit Transfer    Stand-Sit Cross (Transfers)  stand by assist  -LN      Assistive Device (Stand-Sit Transfers)  walker, front-wheeled  -LN      Recorded by [LN] Genesis Choudhury, PT 02/02/20 1147      Row Name 02/02/20 0855             Gait/Stairs Assessment/Training    Cross Level (Gait)  contact guard;verbal cues  -LN      Assistive Device (Gait)  walker, front-wheeled  -LN      Distance in Feet (Gait)  12 feet, NWB left leg,  -LN      Pattern (Gait)  swing-to  -LN      Deviations/Abnormal Patterns (Gait)  bruna decreased;stride length decreased  -LN      Comment (Gait/Stairs)  Distance limited secondary to fatigue and c/o left ankle pain.  No c/o any right ankle pain today.   -LN      Recorded by [LN] Genesis Choudhury, PT 02/02/20 1147      Row Name 02/02/20 0855             Positioning and Restraints    Pre-Treatment Position  in bed  -LN      Post Treatment Position  chair  -LN      In Chair  reclined;notified nsg;call light within  reach;encouraged to call for assist;LLE elevated;legs elevated  -LN      Recorded by [LN] Genesis Choudhury, PT 02/02/20 1147      Row Name 02/02/20 0855             Pain Scale: Numbers Pre/Post-Treatment    Pain Scale: Numbers, Pretreatment  5/10  -LN      Pain Scale: Numbers, Post-Treatment  6/10  -LN      Pre/Post Treatment Pain Comment  Patient reports she was just given pain meds through her IV.   -LN      Pain Intervention(s)  Medication (See MAR);Repositioned;Ambulation/increased activity  -LN      Recorded by [LN] Genesis Choudhury, PT 02/02/20 1147      Row Name                         Row Name 02/02/20 0855             Plan of Care Review    Plan of Care Reviewed With  patient  -LN      Progress  improving  -LN      Outcome Summary  PT evaluation: Patient able to transfer supine to sit today with minimal assist of 1 with left leg only, Patient having more pain in left ankle today secondary to nerve block wore off.  She is able to transfer sit to stand with FWW with SBA, NWB left leg. She ambulated 12 feet with FWW with CGA of 1 and able to maintain NWB left leg well. Distance was limited secondary to fatigue and c/o left ankle pain.  No c/o any right ankle pain this am.  MD did order an active ankle brace ( after PT had seen patient) for her to wear on right ankle for extra ankle support. Issued patient active ankle and instructed her in how to put it on.  Written instructions issued.  Patient to have  bring her a better tennis shoe that opens up more in order to make it easier for her to apply brace.  Patient no longer needs to practice steps secondary to her  building her a ramp to enter home. Patient will need a FWW, bedside commode, and a wheelchair with elevating legs for home.  Will continue to follow patient while in hospital for functional mobility and gait training with FWW, NWB left and review use and application of right active ankle brace as needed. Plan is for patient  to go home tomorrow with .   -LN      Recorded by [LN] Genesis Choudhury, PT 02/02/20 1147      Row Name 02/02/20 0855             Outcome Summary/Treatment Plan (PT)    Daily Summary of Progress (PT)  progress toward functional goals is good  -LN      Barriers to Overall Progress (PT)  fatigue and pain  -LN      Anticipated Equipment Needs at Discharge (PT)  bedside commode;front wheeled walker;wheelchair w/c with elevated legs  -LN      Recorded by [LN] Genesis Choudhury, PT 02/02/20 1147        User Key  (r) = Recorded By, (t) = Taken By, (c) = Cosigned By    Initials Name Effective Dates Discipline    LN Genesis Choudhury, PT 06/22/16 -  PT                 PT Recommendation and Plan  Anticipated Discharge Disposition (PT): home with assist  Therapy Frequency (PT Clinical Impression): other (see comments)(1-2 times a day)  Outcome Summary/Treatment Plan (PT)  Daily Summary of Progress (PT): progress toward functional goals is good  Barriers to Overall Progress (PT): fatigue and pain  Anticipated Equipment Needs at Discharge (PT): bedside commode, front wheeled walker, wheelchair(w/c with elevated legs)  Anticipated Discharge Disposition (PT): home with assist  Plan of Care Reviewed With: patient  Progress: improving  Outcome Summary: PT evaluation: Patient able to transfer supine to sit today with minimal assist of 1 with left leg only, Patient having more pain in left ankle today secondary to nerve block wore off.  She is able to transfer sit to stand with FWW with SBA, NWB left leg. She ambulated 12 feet with FWW with CGA of 1 and able to maintain NWB left leg well. Distance was limited secondary to fatigue and c/o left ankle pain.  No c/o any right ankle pain this am.  MD did order an active ankle brace ( after PT had seen patient) for her to wear on right ankle for extra ankle support. Issued patient active ankle and instructed her in how to put it on.  Written instructions issued.   Patient to have  bring her a better tennis shoe that opens up more in order to make it easier for her to apply brace.  Patient no longer needs to practice steps secondary to her  building her a ramp to enter home. Patient will need a FWW, bedside commode, and a wheelchair with elevating legs for home.  Will continue to follow patient while in hospital for functional mobility and gait training with FWW, NWB left and review use and application of right active ankle brace as needed. Plan is for patient to go home tomorrow with .        Outcome Measures     Row Name 02/02/20 0900 02/02/20 0855 02/01/20 1307       How much help from another person do you currently need...    Turning from your back to your side while in flat bed without using bedrails?  --  3  -LN  3  -LN    Moving from lying on back to sitting on the side of a flat bed without bedrails?  --  3  -LN  3  -LN    Moving to and from a bed to a chair (including a wheelchair)?  --  3  -LN  2  -LN    Standing up from a chair using your arms (e.g., wheelchair, bedside chair)?  --  3  -LN  2  -LN    Climbing 3-5 steps with a railing?  --  2  -LN  2  -LN    To walk in hospital room?  --  3  -LN  2  -LN    AM-PAC 6 Clicks Score (PT)  --  17  -LN  14  -LN       How much help from another is currently needed...    Putting on and taking off regular lower body clothing?  3  -JJ  --  --    Bathing (including washing, rinsing, and drying)  3  -JJ  --  --    Toileting (which includes using toilet bed pan or urinal)  3  -JJ  --  --    Putting on and taking off regular upper body clothing  4  -JJ  --  --    Taking care of personal grooming (such as brushing teeth)  4  -JJ  --  --    Eating meals  4  -JJ  --  --    AM-PAC 6 Clicks Score (OT)  21  -JJ  --  --       Functional Assessment    Outcome Measure Options  AM-PAC 6 Clicks Daily Activity (OT)  -JJ  AM-PAC 6 Clicks Basic Mobility (PT)  -LN  AM-PAC 6 Clicks Basic Mobility (PT)  -LN      User Key  (r)  = Recorded By, (t) = Taken By, (c) = Cosigned By    Initials Name Provider Type    Kary Munguia, OTR Occupational Therapist    Genesis Anderson, PT Physical Therapist         Time Calculation:   PT Charges     Row Name 02/02/20 1153             Time Calculation    Start Time  0855  -LN      Stop Time  0921  -LN      Time Calculation (min)  26 min  -LN        User Key  (r) = Recorded By, (t) = Taken By, (c) = Cosigned By    Initials Name Provider Type    Genesis Anderson, PT Physical Therapist        Therapy Charges for Today     Code Description Service Date Service Provider Modifiers Qty    59496725526 HC PT THER PROC EA 15 MIN 2/1/2020 Genesis Choudhury, PT GP 1    86067345533 HC PT THER SUPP EA 15 MIN 2/1/2020 Genesis Choudhury, PT GP 1    22613901607 HC PT EVAL LOW COMPLEXITY 1 2/1/2020 Genesis Choudhury, PT GP 1    53103103426 HC GAIT TRAINING EA 15 MIN 2/2/2020 Genesis Choudhury, PT GP 1    88386282803 HC PT THER PROC EA 15 MIN 2/2/2020 Genesis Choudhury, PT GP 1          PT G-Codes  Outcome Measure Options: AM-PAC 6 Clicks Daily Activity (OT)  AM-PAC 6 Clicks Score (PT): 17  AM-PAC 6 Clicks Score (OT): 21    Genesis Choudhury, PT  2/2/2020

## 2020-02-02 NOTE — PROGRESS NOTES
Orthopedic Progress Note   Chief Complaint:   Status post ORIF left lateral neck fracture with syndesmotic tight rope    Subjective     Interval History: Patient postop day 1.  She is afebrile this morning.  States the leg still feels numb from the preoperative block.  Although she did take IV pain medication          Objective     Vital Signs  Temp:  [96.9 °F (36.1 °C)-98.5 °F (36.9 °C)] 98 °F (36.7 °C)  Heart Rate:  [] 91  Resp:  [12-23] 17  BP: (102-180)/() 140/86  Body mass index is 37.16 kg/m².    Intake/Output Summary (Last 24 hours) at 2/2/2020 0851  Last data filed at 2/2/2020 0600  Gross per 24 hour   Intake 2450 ml   Output 3100 ml   Net -650 ml     No intake/output data recorded.       Physical Exam:   General: patient awake, alert and cooperative   Cardiovascular: regular rhythm and rate   Pulm: clear to auscultation bilaterally   Abdomen: Benign.  Soft bowel sounds   Extremities: Left leg is good distal pulses no motor or sensory deficit.   Neurologic: Normal mood and behavior     Results Review:     I reviewed the patient's new clinical results.      WBC No results found for: WBCS   HGB Hemoglobin   Date Value Ref Range Status   01/31/2020 12.1 12.0 - 15.9 g/dL Final      HCT Hematocrit   Date Value Ref Range Status   01/31/2020 34.8 34.0 - 46.6 % Final      Platlets No results found for: LABPLAT     PT/INR:  No results found for: PROTIME/No results found for: INR    Sodium Sodium   Date Value Ref Range Status   02/02/2020 134 (L) 136 - 145 mmol/L Final   02/01/2020 132 (L) 136 - 145 mmol/L Final   01/31/2020 123 (L) 136 - 145 mmol/L Final   01/31/2020 127 (L) 136 - 145 mmol/L Final      Potassium Potassium   Date Value Ref Range Status   02/02/2020 4.0 3.5 - 5.2 mmol/L Final   02/01/2020 4.2 3.5 - 5.2 mmol/L Final   01/31/2020 3.7 3.5 - 5.2 mmol/L Final   01/31/2020 3.7 3.5 - 5.2 mmol/L Final      Chloride Chloride   Date Value Ref Range Status   02/02/2020 97 (L) 98 - 107 mmol/L Final    02/01/2020 92 (L) 98 - 107 mmol/L Final   01/31/2020 83 (L) 98 - 107 mmol/L Final   01/31/2020 86 (L) 98 - 107 mmol/L Final      Bicarbonate No results found for: PLASMABICARB   BUN BUN   Date Value Ref Range Status   02/02/2020 10 6 - 20 mg/dL Final   02/01/2020 9 6 - 20 mg/dL Final   01/31/2020 8 6 - 20 mg/dL Final   01/31/2020 6 6 - 20 mg/dL Final      Creatinine Creatinine   Date Value Ref Range Status   02/02/2020 0.61 0.57 - 1.00 mg/dL Final   02/01/2020 0.68 0.57 - 1.00 mg/dL Final   01/31/2020 0.75 0.57 - 1.00 mg/dL Final   01/31/2020 0.70 0.57 - 1.00 mg/dL Final      Calcium Calcium   Date Value Ref Range Status   02/02/2020 9.4 8.6 - 10.5 mg/dL Final   02/01/2020 9.4 8.6 - 10.5 mg/dL Final   01/31/2020 9.1 8.6 - 10.5 mg/dL Final   01/31/2020 9.2 8.6 - 10.5 mg/dL Final      Magnesium  AST  ALT  Bilirubin, Total  AlkPhos  Albumin    Amylase  Lipase    Radiology: No results found for: MG  No components found for: AST.*  No components found for: ALT.*  No results found for: BILIRUBIN    No components found for: ALKPHOS.*  No components found for: ALBUMIN.*      No components found for: AMYLASE.*  No components found for: LIPASE.*            Imaging Results (Most Recent)     Procedure Component Value Units Date/Time    XR Chest PA & Lateral [[id number]] Collected:  02/01/20 2211     Updated:  02/01/20 2213    Narrative:       CHEST X-RAY, 1/31/2020      HISTORY:    55-year-old female undergoing preoperative assessment prior to ankle fracture. History of hypertension and smoking.      TECHNIQUE:  AP lateral upright chest series obtained with the patient in a wheelchair.      FINDINGS:  The lungs are symmetrically expanded and clear. No visible pulmonary infiltrate, pneumothorax or pleural effusion. Heart size and pulmonary vascularity are within normal limits. Old healed left fifth rib fracture.      Impression:       No active disease.    Signer Name: Aj Sims MD   Signed: 2/1/2020 10:11 PM    Workstation Name: [facility]    [facility]    XR Ankle 2 View Left [[id number]] Collected:  02/01/20 2210     Updated:  02/01/20 2212    Narrative:       FLUOROSCOPY DURING LEFT ANKLE SURGERY, 2/1/2020    HISTORY:  Fluoroscopy during left ankle surgery. Lateral malleolus fracture. Initial encounter.    REPORT:  C-arm fluoroscopy was provided by radiology personnel in the OR during left ankle surgery. Fluoroscopy time was recorded as 3 minutes, 36 seconds. Number of fluoroscopic images recorded for documentation purposes: 2. Please see operative note for  details.    Signer Name: Aj Sims MD   Signed: 2/1/2020 10:10 PM   Workstation Name: [facility]    [facility]    FL C Arm During Surgery [[id number]] Collected:  02/01/20 2210     Updated:  02/01/20 2212    Narrative:       FLUOROSCOPY DURING LEFT ANKLE SURGERY, 2/1/2020    HISTORY:  Fluoroscopy during left ankle surgery. Lateral malleolus fracture. Initial encounter.    REPORT:  C-arm fluoroscopy was provided by radiology personnel in the OR during left ankle surgery. Fluoroscopy time was recorded as 3 minutes, 36 seconds. Number of fluoroscopic images recorded for documentation purposes: 2. Please see operative note for  details.    Signer Name: Aj Sims MD   Signed: 2/1/2020 10:10 PM   Workstation Name: [facility]    [facility]                    Assessment/Plan     Patient Active Problem List   Diagnosis Code   • Closed displaced fracture of lateral malleolus of left fibula S82.62XA   • Syndesmotic disruption of left ankle S93.432A   • Lateral malleolar fracture S82.63XA         Continue PT OT today.  We will try oral medication only for pain control dissipating discharge home tomorrow.    Cliff Nunn MD  02/02/20  8:51 AM          Dictated utilizing Dragon dictation

## 2020-02-02 NOTE — PLAN OF CARE
Problem: Patient Care Overview  Goal: Plan of Care Review  Note:   PT evaluation: Patient able to transfer supine to sit today with minimal assist of 1 with left leg only, Patient having more pain in left ankle today secondary to nerve block wore off.  She is able to transfer sit to stand with FWW with SBA, NWB left leg. She ambulated 12 feet with FWW with CGA of 1 and able to maintain NWB left leg well. Distance was limited secondary to fatigue and c/o left ankle pain.  No c/o any right ankle pain this am.  MD did order an active ankle brace ( after PT had seen patient) for her to wear on right ankle for extra ankle support. Issued patient active ankle and instructed her in how to put it on.  Written instructions issued.  Patient to have  bring her a better tennis shoe that opens up more in order to make it easier for her to apply brace.  Patient no longer needs to practice steps secondary to her  building her a ramp to enter home. Patient will need a FWW, bedside commode, and a wheelchair with elevating legs for home.  Will continue to follow patient while in hospital for functional mobility and gait training with FWW, NWB left and review use and application of right active ankle brace as needed. Plan is for patient to go home tomorrow with .

## 2020-02-02 NOTE — CONSULTS
"Adult Nutrition  Assessment/PES    Patient Name:  Marilyn Gottlieb  YOB: 1965  MRN: [id number]  Admit Date:  1/31/2020    Assessment Date:  2/1/2020    Comments:  Education on diet for diabetes (see below):      Reason for Assessment     Row Name 02/01/20 1926          Reason for Assessment    Reason For Assessment  physician consult     Diagnosis  -- status post left fibula fracture, new onset diabetes, hypertension     Identified At Risk by Screening Criteria  need for education           Anthropometrics     Row Name 02/01/20 1927          Anthropometrics    Height  166.1 cm (65.39\")        Admit Weight    Admit Weight  103 kg (226 lb)        Ideal Body Weight (IBW)    Ideal Body Weight (IBW) (kg)  58.19        Body Mass Index (BMI)    BMI Assessment  -- 37.6         Labs/Tests/Procedures/Meds     Row Name 02/01/20 1927          Labs/Procedures/Meds    Lab Results Reviewed  reviewed     Lab Results Comments  efaf9b=3.6, Na 132 (up from 123)        Medications    Pertinent Medications Reviewed  reviewed           Estimated/Assessed Needs     Row Name 02/01/20 1928 02/01/20 1927       Calculation Measurements    Height  --  166.1 cm (65.39\")       Estimated/Assessed Needs    Additional Documentation  Fluid Requirements (Group);Strafford-St. Jeor Equation (Group);Protein Requirements (Group);Calorie Requirements (Group)  --       Calorie Requirements    Estimated Calorie Need Method  Lindsay-St Lisa with no activity factor  --    Estimated Calorie Requirement Comment  1,633 estimated carbohydrate uyskl=163 grams per day  --       Protein Requirements    Est Protein Requirement Amount (gms/kg)  0.8 gm protein 82 grams per day  --       Fluid Requirements    Estimated Fluid Requirement Method  RDA Method 1,633  --        Nutrition Prescription Ordered     Row Name 02/01/20 1929          Nutrition Prescription PO    Common Modifiers  Consistent Carbohydrate With 1,500 ml fluid restriction        Evaluation of " Received Nutrient/Fluid Intake     Row Name 02/01/20 1929          Fluid Intake Evaluation    Oral Fluid (mL)  -- insufficient data        PO Evaluation    Number of Meals  2     % PO Intake  100%               Problem/Interventions:  Problem 1     Row Name 02/01/20 1930          Nutrition Diagnoses Problem 1    Problem 1  Knowledge Deficit     Etiology (related to)  MNT for Treatment/Condition     Signs/Symptoms (evidenced by)  Potential Information Deficit               Intervention Goal     Row Name 02/01/20 1930          Intervention Goal    PO  PO intake (%)     PO Intake %  75 % or greater of meals         Nutrition Intervention     Row Name 02/01/20 1930          Nutrition Intervention    RD/Tech Action  Follow Tx progress           Education/Evaluation     Row Name 02/01/20 1930          Education    Education  Provided education regarding also education on fluid restriction     Provided education regarding  -- consistent carbohydrate diet with no more than 180 grams carbohydrate per day, pt. demonstrated good understanding by ability to state recommendation for no more than 60 grams carbohhydrate at a meal, read a food label for carbohydrate        Monitor/Evaluation    Monitor  I&O;PO intake;Weight;Skin status expect good compliance     Education Follow-up  -- provided with handouts:  Simple Steps for Counting Carbohydrate, Seasoning Your Food, Facts on Fluid and RD contact information           Electronically signed by:  Haley Leonardo RD  02/01/20 7:32 PM

## 2020-02-02 NOTE — PLAN OF CARE
Problem: Patient Care Overview  Goal: Plan of Care Review  Flowsheets (Taken 2/2/2020 0900)  Outcome Summary: OT evaluation completed. pt required min assist for bed mobility and SBA for functional transfers from elevated bed surface with RW. pt required CGA for functional mobility x 10 feet with RW. pt able to maintain NWB of L LE throughout mobility however co increased fatigue and pain. anticipate pt to require min assist for lb adsl. pt states spouse will be ther to assist her if needed and she will perform sink side baths while having to adhere to NWB. pt provided with reacher for Lb adls and home safety. pt states spouse is building a ramp today for her to enter home. No further skilled OT recommendations at this time.

## 2020-02-02 NOTE — PROGRESS NOTES
Patient: Marilyn Gottlieb  Procedure(s):  ANKLE OPEN REDUCTION INTERNAL FIXATION-with syndesmotic tight rope.  Lateral malleolus.  Anesthesia type: general with block    Patient location: Med Surg     Vitals:    02/02/20 0000 02/02/20 0500 02/02/20 1017 02/02/20 1111   BP: 147/73 140/86  135/85   BP Location:  Left arm  Left arm   Patient Position:  Lying  Sitting   Pulse: 103 91 99 94   Resp: 15 17 23 20   Temp: 97.9 °F (36.6 °C) 98 °F (36.7 °C)  97.7 °F (36.5 °C)   TempSrc: Oral Oral  Oral   SpO2: 95% 97% 98% 94%   Weight:       Height:         Level of consciousness: awake, alert and oriented    Post-anesthesia pain: adequate analgesia  Airway patency: patent  Respiratory: unassisted  Cardiovascular: stable and blood pressure at baseline  Hydration: euvolemic    Anesthetic complications: no

## 2020-02-02 NOTE — PROGRESS NOTES
"Daily Progress Note:      Chief complaint: Status post ORIF left bimalleolar fracture, new onset diabetes    Subjective: She is without complaints no cough congestion shortness of breath     LOS: 1 day     Vital Signs  Temp:  [96.9 °F (36.1 °C)-98 °F (36.7 °C)] 97.7 °F (36.5 °C)  Heart Rate:  [] 94  Resp:  [13-23] 20  BP: (115-180)/() 135/85  Oxygen Therapy  SpO2: 94 %  Pulse Oximetry Type: Continuous  Device (Oxygen Therapy): nasal cannula with ETCO2  Flow (L/min): 1(turned off O2 at this time)  ETCO2 (mmHg): 38 mmHg  $ ETCO2 Daily Assessment (RT Only): yes}  Body mass index is 37.16 kg/m².  Flowsheet Rows      First Filed Value   Admission Height  165.1 cm (65\") Documented at 01/31/2020 1316   Admission Weight  103 kg (226 lb) Documented at 01/31/2020 1316                   Documented weights    01/31/20 1316   Weight: 103 kg (226 lb)           Patient Vitals for the past 24 hrs:   BP Temp Temp src Pulse Resp SpO2 Height   02/02/20 1111 135/85 97.7 °F (36.5 °C) Oral 94 20 94 % --   02/02/20 1017 -- -- -- 99 23 98 % --   02/02/20 0500 140/86 98 °F (36.7 °C) Oral 91 17 97 % --   02/02/20 0000 147/73 97.9 °F (36.6 °C) Oral 103 15 95 % --   02/01/20 1927 172/86 97.8 °F (36.6 °C) Oral 108 13 94 % 166.1 cm (65.39\")   02/01/20 1613 127/74 96.9 °F (36.1 °C) Oral 102 18 95 % --   02/01/20 1513 115/67 97.2 °F (36.2 °C) Oral 89 18 96 % --   02/01/20 1413 157/74 -- -- 99 18 94 % --   02/01/20 1343 (!) 180/111 -- -- 99 18 94 % --       103 kg (226 lb)      Intake/Output Summary (Last 24 hours) at 2/2/2020 1311  Last data filed at 2/2/2020 0900  Gross per 24 hour   Intake 1570 ml   Output 2700 ml   Net -1130 ml       Review of Systems   Constitutional: Negative for activity change, appetite change and fatigue.   HENT: Negative for congestion.    Respiratory: Negative for cough, chest tightness, shortness of breath and wheezing.    Cardiovascular: Negative for chest pain.   Gastrointestinal: Negative for abdominal " distention, abdominal pain, diarrhea, nausea and vomiting.   Endocrine: Negative for polyphagia and polyuria.   Genitourinary: Negative for frequency.   Skin: Negative for rash.   Neurological: Negative for light-headedness.   Hematological: Does not bruise/bleed easily.   Psychiatric/Behavioral: Negative for agitation and behavioral problems.       Physical Exam   Constitutional: She appears well-developed and well-nourished.   Obese   HENT:   Head: Normocephalic.   Mouth/Throat: Oropharynx is clear and moist.   Eyes: Conjunctivae are normal.   Neck: Normal range of motion. No JVD present. No thyromegaly present.   Cardiovascular: Normal rate, regular rhythm and normal heart sounds.   No murmur heard.  Pulmonary/Chest: Effort normal and breath sounds normal. No respiratory distress. She has no wheezes. She has no rales.   Abdominal: Soft. Bowel sounds are normal. She exhibits no distension. There is no tenderness. There is no guarding.   Musculoskeletal:        Left ankle: She exhibits deformity.   In immobilizer   Neurological: She is alert.   Skin: Skin is warm and dry. No rash noted.   Nursing note and vitals reviewed.      Medication Review:   I have reviewed the patient's current medication list  Scheduled Meds:  aspirin 325 mg Oral Q12H   docusate sodium 100 mg Oral Daily   insulin aspart 0-7 Units Subcutaneous 4x Daily AC & at Bedtime   metFORMIN 500 mg Oral BID With Meals   nicotine 1 patch Transdermal Q24H   pantoprazole 40 mg Oral QAM   PARoxetine 40 mg Oral Nightly   sodium chloride 10 mL Intravenous Q12H     Continuous Infusions:   PRN Meds:.dextrose  •  dextrose  •  glucagon (human recombinant)  •  ketorolac  •  ondansetron  •  oxyCODONE-acetaminophen  •  sodium chloride  •  sodium chloride      Labs:  Results from last 7 days   Lab Units 01/31/20  1600   WBC 10*3/mm3 9.30   HEMOGLOBIN g/dL 12.1   HEMATOCRIT % 34.8   PLATELETS 10*3/mm3 188     Results from last 7 days   Lab Units 02/02/20  8018  02/01/20  0612 01/31/20  2302 01/31/20  1637   SODIUM mmol/L 134* 132* 123* 127*   POTASSIUM mmol/L 4.0 4.2 3.7 3.7   CHLORIDE mmol/L 97* 92* 83* 86*   CO2 mmol/L 26.8 28.5 29.4* 28.0   BUN mg/dL 10 9 8 6   CREATININE mg/dL 0.61 0.68 0.75 0.70   CALCIUM mg/dL 9.4 9.4 9.1 9.2   GLUCOSE mg/dL 142* 185* 196* 209*           Lab Results (last 24 hours)     Procedure Component Value Units Date/Time    POC Glucose Once [[id number]]  (Normal) Collected:  02/02/20 1156    Specimen:  Blood Updated:  02/02/20 1206     Glucose 129 mg/dL     POC Glucose Once [[id number]]  (Abnormal) Collected:  02/02/20 0730    Specimen:  Blood Updated:  02/02/20 0737     Glucose 156 mg/dL     Basic Metabolic Panel [[id number]]  (Abnormal) Collected:  02/02/20 0448    Specimen:  Blood Updated:  02/02/20 0511     Glucose 142 mg/dL      BUN 10 mg/dL      Creatinine 0.61 mg/dL      Sodium 134 mmol/L      Potassium 4.0 mmol/L      Chloride 97 mmol/L      CO2 26.8 mmol/L      Calcium 9.4 mg/dL      eGFR Non African Amer 103 mL/min/1.73      BUN/Creatinine Ratio 16.4     Anion Gap 10.2 mmol/L     Narrative:       GFR Normal >60  Chronic Kidney Disease <60  Kidney Failure <15      POC Glucose Once [[id number]]  (Abnormal) Collected:  02/01/20 2055    Specimen:  Blood Updated:  02/01/20 2102     Glucose 198 mg/dL     TSH [[id number]]  (Normal) Collected:  02/01/20 0612    Specimen:  Blood Updated:  02/01/20 1851     TSH 2.980 uIU/mL     POC Glucose Once [[id number]]  (Abnormal) Collected:  02/01/20 1708    Specimen:  Blood Updated:  02/01/20 1717     Glucose 212 mg/dL             Results from last 7 days   Lab Units 02/01/20  0612   TSH uIU/mL 2.980                       Invalid input(s): LDLCALC  Results from last 7 days   Lab Units 01/31/20  2334   PH, ARTERIAL pH units 7.351   PCO2, ARTERIAL mm Hg 53.6*   PO2 ART mm Hg 88.0   HCO3 ART mmol/L 29.6*     Results from last 7 days   Lab Units 01/31/20  1600   HEMOGLOBIN A1C % 7.60*     Glucose   Date/Time Value  Ref Range Status   02/02/2020 1156 129 70 - 130 mg/dL Final   02/02/2020 0730 156 (H) 70 - 130 mg/dL Final   02/01/2020 2055 198 (H) 70 - 130 mg/dL Final   02/01/2020 1708 212 (H) 70 - 130 mg/dL Final   02/01/2020 1208 207 (H) 70 - 130 mg/dL Final   01/31/2020 2118 258 (H) 70 - 130 mg/dL Final   01/31/2020 1924 234 (H) 70 - 130 mg/dL Final                         Radiology:  Imaging Results (Last 24 Hours)     Procedure Component Value Units Date/Time    XR Chest PA & Lateral [[id number]] Collected:  02/01/20 2211     Updated:  02/01/20 2213    Narrative:       CHEST X-RAY, 1/31/2020      HISTORY:    55-year-old female undergoing preoperative assessment prior to ankle fracture. History of hypertension and smoking.      TECHNIQUE:  AP lateral upright chest series obtained with the patient in a wheelchair.      FINDINGS:  The lungs are symmetrically expanded and clear. No visible pulmonary infiltrate, pneumothorax or pleural effusion. Heart size and pulmonary vascularity are within normal limits. Old healed left fifth rib fracture.      Impression:       No active disease.    Signer Name: Aj Sims MD   Signed: 2/1/2020 10:11 PM   [facility]    [facility]    XR Ankle 2 View Left [[id number]] Collected:  02/01/20 2210     Updated:  02/01/20 2212    Narrative:       FLUOROSCOPY DURING LEFT ANKLE SURGERY, 2/1/2020    HISTORY:  Fluoroscopy during left ankle surgery. Lateral malleolus fracture. Initial encounter.    REPORT:  C-arm fluoroscopy was provided by radiology personnel in the OR during left ankle surgery. Fluoroscopy time was recorded as 3 minutes, 36 seconds. Number of fluoroscopic images recorded for documentation purposes: 2. Please see operative note for  details.    Signer Name: Aj Sims MD   Signed: 2/1/2020 10:10 PM   Workstation Name: [email or web address]    [facility]    FL C Arm During Surgery [[id number]] Collected:   02/01/20 2210     Updated:  02/01/20 2212    Narrative:       FLUOROSCOPY DURING LEFT ANKLE SURGERY, 2/1/2020    HISTORY:  Fluoroscopy during left ankle surgery. Lateral malleolus fracture. Initial encounter.    REPORT:  C-arm fluoroscopy was provided by radiology personnel in the OR during left ankle surgery. Fluoroscopy time was recorded as 3 minutes, 36 seconds. Number of fluoroscopic images recorded for documentation purposes: 2. Please see operative note for  details.    Signer Name: Aj Sims MD   Signed: 2/1/2020 10:10 PM   Workstation Name: [email or web address]    [facility]          Cardiology:  ECG/EMG Results (last 24 hours)     ** No results found for the last 24 hours. **          I have reviewed recent labs results and consult notes.  Parts of this note may have been copied and pasted but patient was examined and interviewed by me today    Assessment and Plan:    1.  Newly diagnosed diabetes blood sugars trending down tolerating metformin we will continue to monitor start a statin    2.  Depression and anxiety stable nothing acute continue with home SSRI    3.  Postop day #2 status post ORIF left bimalleolar fracture stable    4.  Hyponatremia likely SIADH and hyperglycemia continue to monitor has been improving    5.  Hypertension blood pressure little elevated start ACE      Much of this encounter note is an electronic transcription/translation of spoken language to printed text using Dragon Software

## 2020-02-03 VITALS
BODY MASS INDEX: 37.65 KG/M2 | HEART RATE: 92 BPM | SYSTOLIC BLOOD PRESSURE: 144 MMHG | HEIGHT: 65 IN | RESPIRATION RATE: 18 BRPM | WEIGHT: 226 LBS | TEMPERATURE: 97.8 F | OXYGEN SATURATION: 91 % | DIASTOLIC BLOOD PRESSURE: 89 MMHG

## 2020-02-03 LAB — GLUCOSE BLDC GLUCOMTR-MCNC: 132 MG/DL (ref 70–130)

## 2020-02-03 PROCEDURE — 99024 POSTOP FOLLOW-UP VISIT: CPT | Performed by: ORTHOPAEDIC SURGERY

## 2020-02-03 PROCEDURE — 82962 GLUCOSE BLOOD TEST: CPT

## 2020-02-03 PROCEDURE — 94799 UNLISTED PULMONARY SVC/PX: CPT

## 2020-02-03 PROCEDURE — G0378 HOSPITAL OBSERVATION PER HR: HCPCS

## 2020-02-03 RX ORDER — NICOTINE 21 MG/24HR
1 PATCH, TRANSDERMAL 24 HOURS TRANSDERMAL
Qty: 30 PATCH | Refills: 1 | Status: SHIPPED | OUTPATIENT
Start: 2020-02-03 | End: 2020-03-11

## 2020-02-03 RX ORDER — OXYCODONE AND ACETAMINOPHEN 7.5; 325 MG/1; MG/1
2 TABLET ORAL EVERY 4 HOURS PRN
Qty: 42 TABLET | Refills: 0 | Status: SHIPPED | OUTPATIENT
Start: 2020-02-03 | End: 2020-02-11

## 2020-02-03 RX ORDER — ASPIRIN 325 MG
325 TABLET ORAL EVERY 12 HOURS SCHEDULED
Qty: 60 TABLET | Refills: 1 | Status: SHIPPED | OUTPATIENT
Start: 2020-02-03 | End: 2020-05-21

## 2020-02-03 RX ADMIN — ASPIRIN 325 MG: 325 TABLET ORAL at 08:39

## 2020-02-03 RX ADMIN — SODIUM CHLORIDE, PRESERVATIVE FREE 10 ML: 5 INJECTION INTRAVENOUS at 08:39

## 2020-02-03 RX ADMIN — DOCUSATE SODIUM 100 MG: 100 CAPSULE, LIQUID FILLED ORAL at 08:39

## 2020-02-03 RX ADMIN — OXYCODONE HYDROCHLORIDE AND ACETAMINOPHEN 2 TABLET: 7.5; 325 TABLET ORAL at 01:41

## 2020-02-03 RX ADMIN — LISINOPRIL 10 MG: 10 TABLET ORAL at 08:39

## 2020-02-03 RX ADMIN — OXYCODONE HYDROCHLORIDE AND ACETAMINOPHEN 2 TABLET: 7.5; 325 TABLET ORAL at 05:57

## 2020-02-03 RX ADMIN — OXYCODONE HYDROCHLORIDE AND ACETAMINOPHEN 2 TABLET: 7.5; 325 TABLET ORAL at 10:36

## 2020-02-03 RX ADMIN — NICOTINE 1 PATCH: 21 PATCH, EXTENDED RELEASE TRANSDERMAL at 08:39

## 2020-02-03 RX ADMIN — PANTOPRAZOLE SODIUM 40 MG: 40 TABLET, DELAYED RELEASE ORAL at 07:17

## 2020-02-03 RX ADMIN — METFORMIN HYDROCHLORIDE 500 MG: 500 TABLET ORAL at 07:17

## 2020-02-03 NOTE — SIGNIFICANT NOTE
02/03/20 1057   Rehab Treatment   Reason Treatment Not Performed   (Pt reports she feels good with her mobility and is ready for DC home. She reports she is waiting for her equipment to jeb carroll/tawny,. Pt reports she has been able to get out of bed and ambulate to the bathroom w walker independently and has no questions.)   Recommendation   PT - Next Appointment 02/03/20  (Pt is being dc today however PT will check on pt as needed if she has questions)

## 2020-02-03 NOTE — CONSULTS
"Diabetes Education  Assessment/Teaching    Patient Name:  Marilyn Gottlieb  YOB: 1965  MRN: [id number]  Admit Date:  1/31/2020      Assessment Date:  2/3/2020    Most Recent Value   General Information    Height  166.1 cm (65.39\")   Weight  103 kg (226 lb)   Weight Method  Bed scale   Pregnancy Assessment   Diabetes History   What type of diabetes do you have?  Type 2   Length of Diabetes Diagnosis  Newly diagnosed <6 months [diagnosed this admission]   Have you had diabetes education/teaching in the past?  no   Do you test your blood sugar at home?  no   Education Preferences   Nutrition Information   Assessment Topics   Healthy Eating - Assessment  Needs education   Being Active - Assessment  Needs education   Taking Medication - Assessment  Needs education   Problem Solving - Assessment  Needs education   Reducing Risk - Assessment  Needs education   Healthy Coping - Assessment  Competent   Monitoring - Assessment  Needs education   DM Goals   Taking Medication - Goal  Tomorrow            Most Recent Value   DM Education Needs   Blood Glucose Target Range  Fasting  and 2 hours after a meal less than 180   Medication  Oral, Side effects [Metformin]   Problem Solving  Hypoglycemia, Hyperglycemia, Sick days, Signs, Symptoms, Treatment   Reducing Risks  Foot care, Cardiovascular, A1C testing   Healthy Eating  RD consult   Physical Activity  None   Physical Activity Frequency  Discussed exercise importance   Healthy Coping  Appropriate   Discharge Plan  Home   Motivation  Engaged   Teaching Method  Explanation, Discussion, Handouts   Patient Response  Verbalized understanding            Other Comments:  Met with patient and  at the bedside.  Discussed/instructed on A1C and goal to be less than 7.0, s/s of hypoglycemia and hyperglycemia and treatment, importance of exercise once she is able to bear weight on her left leg, limiting sugary foods and drinks, metformin and side effects and long term " effects of DM if not controlled.  Encouraged her to come to outpatient diabetes education.  Thank you for this referral.  Please reconsult if needed.        Electronically signed by:  Leyla Joya RN  02/03/20 10:27 AM

## 2020-02-03 NOTE — PLAN OF CARE
Problem: Patient Care Overview  Goal: Plan of Care Review  Outcome: Ongoing (interventions implemented as appropriate)  Note:   Has taken PO pain meds this shift. Bandage C/D/I. Diabetic Nurse educator consulted. DME equipment will be delivered to hosp before D/C home. 1500 cc fluid restriction.

## 2020-02-03 NOTE — PROGRESS NOTES
Orthopedic Progress Note   Chief Complaint: Status post ORIF left lateral malleolar fracture    Subjective     Interval History: Patient is postop day 2.  Still experiencing pain control oral medication.  She is afebrile hemodynamically stable          Objective     Vital Signs  Temp:  [97.7 °F (36.5 °C)-98.5 °F (36.9 °C)] 97.8 °F (36.6 °C)  Heart Rate:  [92-99] 92  Resp:  [18-23] 18  BP: (115-144)/(55-89) 144/89  Body mass index is 37.16 kg/m².    Intake/Output Summary (Last 24 hours) at 2/3/2020 0713  Last data filed at 2/3/2020 0144  Gross per 24 hour   Intake 1200 ml   Output 800 ml   Net 400 ml     No intake/output data recorded.       Physical Exam:   General: patient awake, alert and cooperative   Cardiovascular: regular rhythm and rate   Pulm: clear to auscultation bilaterally   Abdomen: Benign.  Soft bowel sounds   Extremities: Left leg is good distal pulses no motor or sensory deficit.  Good capillary refill.  Calf is nontender   Neurologic: Normal mood and behavior     Results Review:     I reviewed the patient's new clinical results.      WBC No results found for: WBCS   HGB Hemoglobin   Date Value Ref Range Status   01/31/2020 12.1 12.0 - 15.9 g/dL Final      HCT Hematocrit   Date Value Ref Range Status   01/31/2020 34.8 34.0 - 46.6 % Final      Platlets No results found for: LABPLAT     PT/INR:  No results found for: PROTIME/No results found for: INR    Sodium Sodium   Date Value Ref Range Status   02/02/2020 134 (L) 136 - 145 mmol/L Final   02/01/2020 132 (L) 136 - 145 mmol/L Final   01/31/2020 123 (L) 136 - 145 mmol/L Final   01/31/2020 127 (L) 136 - 145 mmol/L Final      Potassium Potassium   Date Value Ref Range Status   02/02/2020 4.0 3.5 - 5.2 mmol/L Final   02/01/2020 4.2 3.5 - 5.2 mmol/L Final   01/31/2020 3.7 3.5 - 5.2 mmol/L Final   01/31/2020 3.7 3.5 - 5.2 mmol/L Final      Chloride Chloride   Date Value Ref Range Status   02/02/2020 97 (L) 98 - 107 mmol/L Final   02/01/2020 92 (L) 98 - 107  mmol/L Final   01/31/2020 83 (L) 98 - 107 mmol/L Final   01/31/2020 86 (L) 98 - 107 mmol/L Final      Bicarbonate No results found for: PLASMABICARB   BUN BUN   Date Value Ref Range Status   02/02/2020 10 6 - 20 mg/dL Final   02/01/2020 9 6 - 20 mg/dL Final   01/31/2020 8 6 - 20 mg/dL Final   01/31/2020 6 6 - 20 mg/dL Final      Creatinine Creatinine   Date Value Ref Range Status   02/02/2020 0.61 0.57 - 1.00 mg/dL Final   02/01/2020 0.68 0.57 - 1.00 mg/dL Final   01/31/2020 0.75 0.57 - 1.00 mg/dL Final   01/31/2020 0.70 0.57 - 1.00 mg/dL Final      Calcium Calcium   Date Value Ref Range Status   02/02/2020 9.4 8.6 - 10.5 mg/dL Final   02/01/2020 9.4 8.6 - 10.5 mg/dL Final   01/31/2020 9.1 8.6 - 10.5 mg/dL Final   01/31/2020 9.2 8.6 - 10.5 mg/dL Final      Magnesium  AST  ALT  Bilirubin, Total  AlkPhos  Albumin    Amylase  Lipase    Radiology: No results found for: MG  No components found for: AST.*  No components found for: ALT.*  No results found for: BILIRUBIN    No components found for: ALKPHOS.*  No components found for: ALBUMIN.*      No components found for: AMYLASE.*  No components found for: LIPASE.*            Imaging Results (Most Recent)     Procedure Component Value Units Date/Time    XR Chest PA & Lateral [[id number]] Collected:  02/01/20 2211     Updated:  02/01/20 2213    Narrative:       CHEST X-RAY, 1/31/2020      HISTORY:    55-year-old female undergoing preoperative assessment prior to ankle fracture. History of hypertension and smoking.      TECHNIQUE:  AP lateral upright chest series obtained with the patient in a wheelchair.      FINDINGS:  The lungs are symmetrically expanded and clear. No visible pulmonary infiltrate, pneumothorax or pleural effusion. Heart size and pulmonary vascularity are within normal limits. Old healed left fifth rib fracture.      Impression:       No active disease.    Signer Name: Aj Sims MD   Signed: 2/1/2020 10:11 PM   Workstation Name: [email or web address]     [facility]    XR Ankle 2 View Left [[id number]] Collected:  02/01/20 2210     Updated:  02/01/20 2212    Narrative:       FLUOROSCOPY DURING LEFT ANKLE SURGERY, 2/1/2020    HISTORY:  Fluoroscopy during left ankle surgery. Lateral malleolus fracture. Initial encounter.    REPORT:  C-arm fluoroscopy was provided by radiology personnel in the OR during left ankle surgery. Fluoroscopy time was recorded as 3 minutes, 36 seconds. Number of fluoroscopic images recorded for documentation purposes: 2. Please see operative note for  details.    Signer Name: Aj Sims MD   Signed: 2/1/2020 10:10 PM   Workstation Name: [email or web address]    [facility]    FL C Arm During Surgery [[id number]] Collected:  02/01/20 2210     Updated:  02/01/20 2212    Narrative:       FLUOROSCOPY DURING LEFT ANKLE SURGERY, 2/1/2020    HISTORY:  Fluoroscopy during left ankle surgery. Lateral malleolus fracture. Initial encounter.    REPORT:  C-arm fluoroscopy was provided by radiology personnel in the OR during left ankle surgery. Fluoroscopy time was recorded as 3 minutes, 36 seconds. Number of fluoroscopic images recorded for documentation purposes: 2. Please see operative note for  details.    Signer Name: Aj Sims MD   Signed: 2/1/2020 10:10 PM   Workstation Name: [email or web address]    [facility]                    Assessment/Plan     Patient Active Problem List   Diagnosis Code   • Closed displaced fracture of lateral malleolus of left fibula S82.62XA   • Syndesmotic disruption of left ankle S93.432A   • Lateral malleolar fracture S82.63XA       Discharge home with Percocet 7.5 for pain.  Patient will do aspirin 325 twice daily for DVT prophylaxis.  She is to remain nonweightbearing. Standard wheelchair required for patient. She is unable to safely use a can or walker due to nonweightbearing status left lower extremity secondary to ORIF left lateral malleolar  fracture. Standard wheelchair needed to assist with activities of daily living inside the home.        Above status instructions reviewed with patient.  Return to see me in the office next week.      Cliff Nunn MD  02/03/20  7:13 AM          Dictated utilizing Dragon dictation

## 2020-02-03 NOTE — DISCHARGE SUMMARY
Orthopedic Discharge Summary      Patient: Marilyn Gottlieb      YOB: 1965    Medical Record Number: [id number]    Attending Physician: Cliff Nunn MD  Consulting Physician(s):   Date of Admission: 1/31/2020 12:43 PM  Date of Discharge: 02/03/2020      Patient Active Problem List   Diagnosis   • Closed displaced fracture of lateral malleolus of left fibula   • Syndesmotic disruption of left ankle   • Lateral malleolar fracture     Status Post: Open reduction total fixation left bimalleolar fracture and syndesmotic disruption with variax 4-hole fibular plate and anthrax tight rope x2    Allergies   Allergen Reactions   • Codeine Nausea And Vomiting       Current Medications:     Discharge Medications      New Medications      Instructions Start Date   aspirin 325 MG tablet   325 mg, Oral, Every 12 Hours Scheduled      nicotine 21 MG/24HR patch  Commonly known as:  NICODERM CQ   1 patch, Transdermal, Every 24 Hours Scheduled      oxyCODONE-acetaminophen 7.5-325 MG per tablet  Commonly known as:  PERCOCET   2 tablets, Oral, Every 4 Hours PRN         Continue These Medications      Instructions Start Date   amitriptyline 100 MG tablet  Commonly known as:  ELAVIL   100 mg, Oral, Nightly      lisinopril-hydrochlorothiazide 20-12.5 MG per tablet  Commonly known as:  PRINZIDE,ZESTORETIC   1 tablet, Oral, Nightly      omeprazole 20 MG capsule  Commonly known as:  priLOSEC   20 mg, Oral, Nightly      PARoxetine 40 MG tablet  Commonly known as:  PAXIL   40 mg, Oral, Nightly      topiramate 50 MG tablet  Commonly known as:  TOPAMAX   50 mg, Oral, Nightly         Stop These Medications    HYDROcodone-acetaminophen 5-325 MG per tablet  Commonly known as:  NORCO     naproxen 500 MG tablet  Commonly known as:  NAPROSYN                Past Medical History:   Diagnosis Date   • Acid reflux    • Anxiety    • Depression    • Eczema     ON TRUNK, BILATERAL ARMS, PATIENT SCRATCHES   • High cholesterol     SUPPOSED TO BE ON  MEDICATIONS   • Hypertension    • Migraine     TAKES TOPAMAX   • MRSA infection     LEFT AXILLA 2014   • Panic attacks      Past Surgical History:   Procedure Laterality Date   • CHOLECYSTECTOMY N/A 11/30/2016    Procedure: CHOLECYSTECTOMY LAPAROSCOPIC;  Surgeon: Clemente Meeks MD;  Location: [facility];  Service:    • HYSTERECTOMY      ABDOMINAL   • ROTATOR CUFF REPAIR     • TUBAL ABDOMINAL LIGATION       Social History     Occupational History   • Not on file   Tobacco Use   • Smoking status: Current Every Day Smoker     Packs/day: 0.50     Years: 20.00     Pack years: 10.00   • Smokeless tobacco: Never Used   Substance and Sexual Activity   • Alcohol use: No   • Drug use: No   • Sexual activity: Defer      Social History     Social History Narrative   • Not on file     Family History   Problem Relation Age of Onset   • Cancer Father          Physical Exam: 55 y.o. female  General Appearance:    Alert, cooperative, in no acute distress                      Vitals:    02/02/20 1523 02/02/20 1841 02/02/20 2000 02/03/20 0626   BP: 115/55 137/78  144/89   BP Location: Right arm Right arm  Right arm   Patient Position: Sitting Lying  Lying   Pulse: 98 95  92   Resp: 18 18  18   Temp: 98.2 °F (36.8 °C) 98.5 °F (36.9 °C)  97.8 °F (36.6 °C)   TempSrc: Oral Oral  Oral   SpO2: 95% 95% 94% 91%   Weight:       Height:            Head:    Normocephalic, without obvious abnormality, atraumatic   Eyes:            Lids and lashes normal, conjunctivae and sclerae normal, no   icterus, no pallor, corneas clear, PERRLA   Ears:    Ears appear intact with no abnormalities noted   Throat:   No oral lesions, no thrush, oral mucosa moist   Neck:   No adenopathy, supple, trachea midline, no thyromegaly, no    carotid bruit, no JVD   Back:     No kyphosis present, no scoliosis present, no skin lesions,       erythema or scars, no tenderness to percussion or                   palpation,   range of motion normal   Lungs:     Clear to  auscultation,respirations regular, even and                   unlabored    Heart:    Regular rhythm and normal rate, normal S1 and S2, no            murmur, no gallop, no rub, no click   Chest Wall:    No abnormalities observed   Abdomen:     Normal bowel sounds, no masses, no organomegaly, soft        non-tender, non-distended, no guarding, no rebound                 tenderness   Rectal:     Deferred   Extremities:   Incision intact without signs or symptoms of infection.               Neurovascular status remains intact to operative extremity.      Moves all extremities well, no edema, no cyanosis, no              redness   Pulses:   Pulses palpable and equal bilaterally   Skin:   No bleeding, bruising or rash   Lymph nodes:   No palpable adenopathy   Neurologic:   Cranial nerves 2 - 12 grossly intact, sensation intact, DTR        present and equal bilaterally           Hospital Course:  55 y.o. female admitted to [facility] to services of Cliff Nunn MD with Lateral malleolar fracture [S82.63XA] on 1/31/2020 and underwent No admission procedures for hospital encounter.  Per Cliff Nunn MD. Antibiotic and VTE prophylaxis were per SCIP protocols. Post-operatively the patient transferred to the post-operative floor where the patient underwent mobilization therapy that included active as well as passive ROM exercises. Opioids were titrated to achieve appropriate pain management to allow for participation in mobilization exercises. Vital signs are now stable. The incision is intact without signs or symptoms of infection. Operative extremity neurovascular status remains intact.   Appropriate education re: incision care, activity levels, medications, and follow up visits was completed and all questions were answered. The patient is now deemed stable for discharge to Home.      DIAGNOSTIC TESTS:     Admission on 01/31/2020   Component Date Value Ref Range Status   • Glucose 01/31/2020 209* 65 - 99 mg/dL Final    • BUN 01/31/2020 6  6 - 20 mg/dL Final   • Creatinine 01/31/2020 0.70  0.57 - 1.00 mg/dL Final   • Sodium 01/31/2020 127* 136 - 145 mmol/L Final   • Potassium 01/31/2020 3.7  3.5 - 5.2 mmol/L Final   • Chloride 01/31/2020 86* 98 - 107 mmol/L Final   • CO2 01/31/2020 28.0  22.0 - 29.0 mmol/L Final   • Calcium 01/31/2020 9.2  8.6 - 10.5 mg/dL Final   • eGFR Non African Amer 01/31/2020 88  >60 mL/min/1.73 Final   • BUN/Creatinine Ratio 01/31/2020 8.6  7.0 - 25.0 Final   • Anion Gap 01/31/2020 13.0  5.0 - 15.0 mmol/L Final   • WBC 01/31/2020 9.30  3.40 - 10.80 10*3/mm3 Final   • RBC 01/31/2020 4.01  3.77 - 5.28 10*6/mm3 Final   • Hemoglobin 01/31/2020 12.1  12.0 - 15.9 g/dL Final   • Hematocrit 01/31/2020 34.8  34.0 - 46.6 % Final   • MCV 01/31/2020 86.8  79.0 - 97.0 fL Final   • MCH 01/31/2020 30.2  26.6 - 33.0 pg Final   • MCHC 01/31/2020 34.8  31.5 - 35.7 g/dL Final   • RDW 01/31/2020 14.2  12.3 - 15.4 % Final   • RDW-SD 01/31/2020 45.1  37.0 - 54.0 fl Final   • MPV 01/31/2020 9.8  6.0 - 12.0 fL Final   • Platelets 01/31/2020 188  140 - 450 10*3/mm3 Final   • Neutrophil % 01/31/2020 78.8* 42.7 - 76.0 % Final   • Lymphocyte % 01/31/2020 16.3* 19.6 - 45.3 % Final   • Monocyte % 01/31/2020 2.9* 5.0 - 12.0 % Final   • Eosinophil % 01/31/2020 1.3  0.3 - 6.2 % Final   • Basophil % 01/31/2020 0.3  0.0 - 1.5 % Final   • Immature Grans % 01/31/2020 0.4  0.0 - 0.5 % Final   • Neutrophils, Absolute 01/31/2020 7.32* 1.70 - 7.00 10*3/mm3 Final   • Lymphocytes, Absolute 01/31/2020 1.52  0.70 - 3.10 10*3/mm3 Final   • Monocytes, Absolute 01/31/2020 0.27  0.10 - 0.90 10*3/mm3 Final   • Eosinophils, Absolute 01/31/2020 0.12  0.00 - 0.40 10*3/mm3 Final   • Basophils, Absolute 01/31/2020 0.03  0.00 - 0.20 10*3/mm3 Final   • Immature Grans, Absolute 01/31/2020 0.04  0.00 - 0.05 10*3/mm3 Final   • nRBC 01/31/2020 0.0  0.0 - 0.2 /100 WBC Final   • Hemoglobin A1C 01/31/2020 7.60* 4.80 - 5.60 % Final   • Glucose 01/31/2020 234* 70 - 130  mg/dL Final   • Glucose 01/31/2020 196* 65 - 99 mg/dL Final   • BUN 01/31/2020 8  6 - 20 mg/dL Final   • Creatinine 01/31/2020 0.75  0.57 - 1.00 mg/dL Final   • Sodium 01/31/2020 123* 136 - 145 mmol/L Final   • Potassium 01/31/2020 3.7  3.5 - 5.2 mmol/L Final   • Chloride 01/31/2020 83* 98 - 107 mmol/L Final   • CO2 01/31/2020 29.4* 22.0 - 29.0 mmol/L Final   • Calcium 01/31/2020 9.1  8.6 - 10.5 mg/dL Final   • eGFR Non African Amer 01/31/2020 81  >60 mL/min/1.73 Final   • BUN/Creatinine Ratio 01/31/2020 10.7  7.0 - 25.0 Final   • Anion Gap 01/31/2020 10.6  5.0 - 15.0 mmol/L Final   • THC, Screen, Urine 02/01/2020 Negative  Negative Final   • Phencyclidine (PCP), Urine 02/01/2020 Negative  Negative Final   • Cocaine Screen, Urine 02/01/2020 Negative  Negative Final   • Methamphetamine, Ur 02/01/2020 Negative  Negative Final   • Opiate Screen 02/01/2020 Positive* Negative Final   • Amphetamine Screen, Urine 02/01/2020 Negative  Negative Final   • Benzodiazepine Screen, Urine 02/01/2020 Negative  Negative Final   • Tricyclic Antidepressants Screen 02/01/2020 Positive* Negative Final   • Methadone Screen, Urine 02/01/2020 Negative  Negative Final   • Barbiturates Screen, Urine 02/01/2020 Negative  Negative Final   • Oxycodone Screen, Urine 02/01/2020 Negative  Negative Final   • Propoxyphene Screen 02/01/2020 Negative  Negative Final   • Buprenorphine, Screen, Urine 02/01/2020 Negative  Negative Final   • Glucose 01/31/2020 258* 70 - 130 mg/dL Final   • Site 01/31/2020 OTHER   Final   • pH, Venous 01/31/2020 7.319* 7.320 - 7.420 pH Units Final    84 Value below reference range   • pCO2, Venous 01/31/2020 62.2* 41.0 - 51.0 mm Hg Final    83 Value above reference range   • pO2, Venous 01/31/2020 24.9* 27.0 - 53.0 mm Hg Final    84 Value below reference range   • HCO3, Venous 01/31/2020 31.9* 22.0 - 28.0 mmol/L Final    83 Value above reference range   • Base Excess, Venous 01/31/2020 4.0* 0.0 - 2.0 mmol/L Final    83  Value above reference range   • O2 Saturation, Venous 01/31/2020 37.1* 45.0 - 75.0 % Final    84 Value below reference range   • Hemoglobin, Blood Gas 01/31/2020 13.1* 13.5 - 17.5 g/dL Final   • Temperature 01/31/2020 37.0  C Final   • Barometric Pressure for Blood Gas 01/31/2020 741  mmHg Final   • Modality 01/31/2020 Nasal Cannula   Final   • Flow Rate 01/31/2020 3.0  lpm Final   • Ventilator Mode 01/31/2020 NA   Final    Meter: [id number]     :  [id number]   • Notified Who 01/31/2020 [email or web address]   Final   • Notified By 01/31/2020 [id number]   Final   • Notified Time 01/31/2020 01/31/2020 23:32   Final   • Site 01/31/2020 Left Radial   Final   • Jesus's Test 01/31/2020 Positive   Final   • pH, Arterial 01/31/2020 7.351  7.350 - 7.450 pH units Final   • pCO2, Arterial 01/31/2020 53.6* 35.0 - 45.0 mm Hg Final    83 Value above reference range   • pO2, Arterial 01/31/2020 88.0  83.0 - 108.0 mm Hg Final   • HCO3, Arterial 01/31/2020 29.6* 20.0 - 26.0 mmol/L Final    83 Value above reference range   • Base Excess, Arterial 01/31/2020 2.9* 0.0 - 2.0 mmol/L Final    83 Value above reference range   • O2 Saturation, Arterial 01/31/2020 97.3  94.0 - 99.0 % Final   • Hemoglobin, Blood Gas 01/31/2020 12.6* 13.5 - 17.5 g/dL Final   • Temperature 01/31/2020 37.0  C Final   • Barometric Pressure for Blood Gas 01/31/2020 741  mmHg Final   • Modality 01/31/2020 Nasal Cannula   Final   • Flow Rate 01/31/2020 3.0  lpm Final   • Ventilator Mode 01/31/2020 NA   Final    Meter: [id number]     :  [id number]   • pCO2, Temperature Corrected 01/31/2020 53.6* 35 - 45 mm Hg Final   • pH, Temp Corrected 01/31/2020 7.351  7.350 - 7.450 pH Units Final   • pO2, Temperature Corrected 01/31/2020 88.0  83 - 108 mm Hg Final   • Glucose 02/01/2020 185* 65 - 99 mg/dL Final   • BUN 02/01/2020 9  6 - 20 mg/dL Final   • Creatinine 02/01/2020 0.68  0.57 - 1.00 mg/dL Final   • Sodium 02/01/2020 132* 136 - 145 mmol/L Final   • Potassium  02/01/2020 4.2  3.5 - 5.2 mmol/L Final   • Chloride 02/01/2020 92* 98 - 107 mmol/L Final   • CO2 02/01/2020 28.5  22.0 - 29.0 mmol/L Final   • Calcium 02/01/2020 9.4  8.6 - 10.5 mg/dL Final   • eGFR Non African Amer 02/01/2020 91  >60 mL/min/1.73 Final   • BUN/Creatinine Ratio 02/01/2020 13.2  7.0 - 25.0 Final   • Anion Gap 02/01/2020 11.5  5.0 - 15.0 mmol/L Final   • Glucose 02/01/2020 207* 70 - 130 mg/dL Final   • Glucose 02/01/2020 212* 70 - 130 mg/dL Final   • TSH 02/01/2020 2.980  0.270 - 4.200 uIU/mL Final   • Glucose 02/01/2020 198* 70 - 130 mg/dL Final   • Glucose 02/02/2020 142* 65 - 99 mg/dL Final   • BUN 02/02/2020 10  6 - 20 mg/dL Final   • Creatinine 02/02/2020 0.61  0.57 - 1.00 mg/dL Final   • Sodium 02/02/2020 134* 136 - 145 mmol/L Final   • Potassium 02/02/2020 4.0  3.5 - 5.2 mmol/L Final   • Chloride 02/02/2020 97* 98 - 107 mmol/L Final   • CO2 02/02/2020 26.8  22.0 - 29.0 mmol/L Final   • Calcium 02/02/2020 9.4  8.6 - 10.5 mg/dL Final   • eGFR Non African Amer 02/02/2020 103  >60 mL/min/1.73 Final   • BUN/Creatinine Ratio 02/02/2020 16.4  7.0 - 25.0 Final   • Anion Gap 02/02/2020 10.2  5.0 - 15.0 mmol/L Final   • Glucose 02/02/2020 156* 70 - 130 mg/dL Final   • Glucose 02/02/2020 129  70 - 130 mg/dL Final   • Glucose 02/02/2020 214* 70 - 130 mg/dL Final   • Glucose 02/02/2020 148* 70 - 130 mg/dL Final   • Glucose 02/03/2020 132* 70 - 130 mg/dL Final       No results found.    Discharge and Follow up Instructions:   Nonweightbearing left lower extremity  Aspirin twice daily DVT prophylaxis  Return to office to see Dr. Nunn in 1 week      Date: 2/3/2020    Maribell Navarro, APRN

## 2020-02-03 NOTE — THERAPY DISCHARGE NOTE
Acute Care - Physical Therapy Discharge Summary   Marylou Esparza       Patient Name: Marilyn Gottlieb  : 1965  MRN: [id number]    Today's Date: 2/3/2020  Onset of Illness/Injury or Date of Surgery: 20    Date of Referral to PT: 20  Referring Physician: Dr Nunn      Admit Date: 2020      PT Recommendation and Plan    Visit Dx:    ICD-10-CM ICD-9-CM   1. Closed displaced fracture of lateral malleolus of left fibula, initial encounter S82.62XA 824.2   2. Syndesmotic disruption of left ankle, initial encounter S93.432A 845.03       Outcome Measures     Row Name 20 0900 20 0855 20 1307       How much help from another person do you currently need...    Turning from your back to your side while in flat bed without using bedrails?  --  3  -LN  3  -LN    Moving from lying on back to sitting on the side of a flat bed without bedrails?  --  3  -LN  3  -LN    Moving to and from a bed to a chair (including a wheelchair)?  --  3  -LN  2  -LN    Standing up from a chair using your arms (e.g., wheelchair, bedside chair)?  --  3  -LN  2  -LN    Climbing 3-5 steps with a railing?  --  2  -LN  2  -LN    To walk in hospital room?  --  3  -LN  2  -LN    AM-PAC 6 Clicks Score (PT)  --  17  -LN  14  -LN       How much help from another is currently needed...    Putting on and taking off regular lower body clothing?  3  -JJ  --  --    Bathing (including washing, rinsing, and drying)  3  -JJ  --  --    Toileting (which includes using toilet bed pan or urinal)  3  -JJ  --  --    Putting on and taking off regular upper body clothing  4  -JJ  --  --    Taking care of personal grooming (such as brushing teeth)  4  -JJ  --  --    Eating meals  4  -JJ  --  --    AM-PAC 6 Clicks Score (OT)  21  -JJ  --  --       Functional Assessment    Outcome Measure Options  AM-PAC 6 Clicks Daily Activity (OT)  -JJ  AM-PAC 6 Clicks Basic Mobility (PT)  -LN  AM-PAC 6 Clicks Basic Mobility (PT)  -LN      User Key  (r) =  Recorded By, (t) = Taken By, (c) = Cosigned By    Initials Name Provider Type    Kary Munguia, OTR Occupational Therapist    Genesis Anderson, PT Physical Therapist          PT Charges     Row Name 02/03/20 1057             Time Calculation    PT - Next Appointment  02/03/20 Pt is being dc today however PT will check on pt as needed if she has questions  -        User Key  (r) = Recorded By, (t) = Taken By, (c) = Cosigned By    Initials Name Provider Type    Nusrat Rosado, PT Physical Therapist          Rehab Goal Summary     Row Name 02/03/20 1347             Bed Mobility Goal 1 (PT)    Activity/Assistive Device (Bed Mobility Goal 1, PT)  bed mobility activities, all;sit to supine;supine to sit  -BP      [place] Level/Cues Needed (Bed Mobility Goal 1, PT)  independent  -BP      Time Frame (Bed Mobility Goal 1, PT)  3 days  -BP      Progress/Outcomes (Bed Mobility Goal 1, PT)  goal not met;discharged from facility  -BP         Transfer Goal 1 (PT)    Activity/Assistive Device (Transfer Goal 1, PT)  sit-to-stand/stand-to-sit;crutches, axillary;walker, rolling with appropriate AD  -BP      [place] Level/Cues Needed (Transfer Goal 1, PT)  independent NWB left leg  -BP      Time Frame (Transfer Goal 1, PT)  3 days  -BP      Progress/Outcome (Transfer Goal 1, PT)  goal not met;discharged from facility  -BP         Transfer Goal 2 (PT)    Activity/Assistive Device (Transfer Goal 2, PT)  bed-to-chair/chair-to-bed;wheelchair transfer;crutches, axillary;walker, rolling with appropriate AD  -BP      [place] Level/Cues Needed (Transfer Goal 2, PT)  supervision required NWB left leg  -BP      Time Frame (Transfer Goal 2, PT)  3 days  -BP      Progress/Outcome (Transfer Goal 2, PT)  goal partially met;discharged from facility  -BP         Gait Training Goal 1 (PT)    Activity/Assistive Device (Gait Training Goal 1, PT)  gait (walking locomotion);assistive device use;maintain weight  bearing status  -BP      [place] Level (Gait Training Goal 1, PT)  contact guard assist;standby assist  -BP      Distance (Gait Goal 1, PT)  25 feet  -BP      Time Frame (Gait Training Goal 1, PT)  3 days  -BP      Progress/Outcome (Gait Training Goal 1, PT)  goal partially met;discharged from facility  -BP         Stairs Goal 1 (PT)    Activity/Assistive Device (Stairs Goal 1, PT)  stairs, all skills;using handrail, right;using handrail, left;maintain weight bearing status  -BP      [place] Level/Cues Needed (Stairs Goal 1, PT)  contact guard assist;minimum assist (75% or more patient effort)  -BP      Number of Stairs (Stairs Goal 1, PT)  3  -BP      Time Frame (Stairs Goal 1, PT)  3 days  -BP      Progress/Outcome (Stairs Goal 1, PT)  discharged from facility;goal no longer appropriate Spouse to build a ramp   -BP        User Key  (r) = Recorded By, (t) = Taken By, (c) = Cosigned By    Initials Name Provider Type Discipline    BP Manny Franks, PT Physical Therapist PT              PT Discharge Summary  Anticipated Discharge Disposition (PT): home with assist  Reason for Discharge: Discharge from facility  Outcomes Achieved: Patient able to partially acheive established goals  Discharge Destination: Home, Home with assist      Manny Franks, PT   2/3/2020

## 2020-02-04 ENCOUNTER — READMISSION MANAGEMENT (OUTPATIENT)
Dept: CALL CENTER | Facility: HOSPITAL | Age: 55
End: 2020-02-04

## 2020-02-04 NOTE — OUTREACH NOTE
Prep Survey      Responses   Facility patient discharged from?  [facility]   Is LACE score < 7 ?  No   Is patient eligible?  Yes   Discharge diagnosis  Closed displaced fracture of lateral malleolus of left fibula, syndesmotic disruption of left ankle, s/p ORIF left bimalleolar fracture and syndesmotic disruption with variax 4-hole fibular plate and anthrax tight rope x2   Does the patient have one of the following disease processes/diagnoses(primary or secondary)?  General Surgery   Does the patient have Home health ordered?  No   Is there a DME ordered?  Yes   What DME was ordered?  ANGEL Wang from [facility]   Comments regarding appointments  Pt to schedule F/u with PCP   Prep survey completed?  Yes          Niru Issa RN

## 2020-02-04 NOTE — NURSING NOTE
Case Management Discharge Note      Final Note: Discharged home         Destination      No service has been selected for the patient.      Durable Medical Equipment - Selection Complete      Service Provider Request Status Selected Services Address Phone Number Fax Number    [facility] [id number] [id number] [phone]      Dialysis/Infusion      No service has been selected for the patient.      Home Medical Care      No service has been selected for the patient.      Therapy      No service has been selected for the patient.      Community Resources      No service has been selected for the patient.             Final Discharge Disposition Code: 01 - home or self-care

## 2020-02-05 ENCOUNTER — READMISSION MANAGEMENT (OUTPATIENT)
Dept: CALL CENTER | Facility: HOSPITAL | Age: 55
End: 2020-02-05

## 2020-02-05 ENCOUNTER — OFFICE VISIT (OUTPATIENT)
Dept: ORTHOPEDIC SURGERY | Facility: CLINIC | Age: 55
End: 2020-02-05

## 2020-02-05 DIAGNOSIS — S82.62XA CLOSED DISPLACED FRACTURE OF LATERAL MALLEOLUS OF LEFT FIBULA, INITIAL ENCOUNTER: ICD-10-CM

## 2020-02-05 DIAGNOSIS — S93.432A SYNDESMOTIC DISRUPTION OF LEFT ANKLE, INITIAL ENCOUNTER: ICD-10-CM

## 2020-02-05 DIAGNOSIS — R52 PAIN: Primary | ICD-10-CM

## 2020-02-05 DIAGNOSIS — Z09 STATUS POST ORTHOPEDIC SURGERY, FOLLOW-UP EXAM: ICD-10-CM

## 2020-02-05 PROCEDURE — 99024 POSTOP FOLLOW-UP VISIT: CPT | Performed by: ORTHOPAEDIC SURGERY

## 2020-02-05 NOTE — OUTREACH NOTE
General Surgery Week 1 Survey      Responses   Facility patient discharged from?  [facility]   Does the patient have one of the following disease processes/diagnoses(primary or secondary)?  General Surgery   Is there a successful TCM telephone encounter documented?  No   Week 1 attempt successful?  No   Unsuccessful attempts  Attempt 1          Arlyn Murray RN

## 2020-02-05 NOTE — PROGRESS NOTES
Subjective: Status post ORIF left lateral malleolus with syndesmotic tight rope     Patient ID: Marilyn Gottlieb is a 55 y.o. female.    Chief Complaint:    History of Present Illness patient is 4 days following her procedure.  Presents today for dressing change       Social History     Occupational History   • Not on file   Tobacco Use   • Smoking status: Current Every Day Smoker     Packs/day: 0.50     Years: 20.00     Pack years: 10.00   • Smokeless tobacco: Never Used   Substance and Sexual Activity   • Alcohol use: No   • Drug use: No   • Sexual activity: Defer      Review of Systems   Constitutional: Negative for chills, diaphoresis, fever and unexpected weight change.   HENT: Negative for hearing loss, nosebleeds, sore throat and tinnitus.    Eyes: Negative for pain and visual disturbance.   Respiratory: Negative for cough, shortness of breath and wheezing.    Cardiovascular: Negative for chest pain and palpitations.   Gastrointestinal: Negative for abdominal pain, diarrhea, nausea and vomiting.   Endocrine: Negative for cold intolerance, heat intolerance and polydipsia.   Genitourinary: Negative for difficulty urinating, dysuria and hematuria.   Musculoskeletal: Positive for arthralgias and myalgias. Negative for joint swelling.   Skin: Negative for rash and wound.   Allergic/Immunologic: Negative for environmental allergies.   Neurological: Negative for dizziness, syncope and numbness.   Hematological: Does not bruise/bleed easily.   Psychiatric/Behavioral: Negative for dysphoric mood and sleep disturbance. The patient is not nervous/anxious.          Past Medical History:   Diagnosis Date   • Acid reflux    • Anxiety    • Depression    • Eczema     ON TRUNK, BILATERAL ARMS, PATIENT SCRATCHES   • High cholesterol     SUPPOSED TO BE ON MEDICATIONS   • Hypertension    • Migraine     TAKES TOPAMAX   • MRSA infection     LEFT AXILLA 2014   • Panic attacks      Past Surgical History:   Procedure Laterality Date   •  ANKLE OPEN REDUCTION INTERNAL FIXATION Left 2/1/2020    Procedure: ANKLE OPEN REDUCTION INTERNAL FIXATION-with syndesmotic tight rope.  Lateral malleolus.;  Surgeon: Cliff Nunn MD;  Location: formerly [facility] OR;  Service: Orthopedics   • CHOLECYSTECTOMY N/A 11/30/2016    Procedure: CHOLECYSTECTOMY LAPAROSCOPIC;  Surgeon: Clemente Meeks MD;  Location:  [facility] OR;  Service:    • HYSTERECTOMY      ABDOMINAL   • ROTATOR CUFF REPAIR     • TUBAL ABDOMINAL LIGATION       Family History   Problem Relation Age of Onset   • Cancer Father          Objective:  There were no vitals filed for this visit.  There were no vitals filed for this visit.  There is no height or weight on file to calculate BMI.        Ortho Exam   He is alert and oriented x3.  Dressing was changed wounds completely benign.  New posterior splint applied with the ankle in neutral position..  Good pulses no motor or sensory deficit.    Assessment:        1. Pain    2. Closed displaced fracture of lateral malleolus of left fibula, initial encounter    3. Syndesmotic disruption of left ankle, initial encounter    4. Status post orthopedic surgery, follow-up exam           Plan: 2 weeks with x-ray of the ankle in the splint may convert to a fracture boot.  Remain strictly nonweightbearing          Work Status:    AYSE query complete.    Orders:  No orders of the defined types were placed in this encounter.      Medications:  No orders of the defined types were placed in this encounter.      Followup:  Return in about 2 weeks (around 2/19/2020).          Dictated utilizing Dragon dictation

## 2020-02-07 ENCOUNTER — READMISSION MANAGEMENT (OUTPATIENT)
Dept: CALL CENTER | Facility: HOSPITAL | Age: 55
End: 2020-02-07

## 2020-02-07 NOTE — OUTREACH NOTE
General Surgery Week 1 Survey      Responses   Facility patient discharged from?  [facility]   Does the patient have one of the following disease processes/diagnoses(primary or secondary)?  General Surgery   Is there a successful TCM telephone encounter documented?  No   Week 1 attempt successful?  No   Unsuccessful attempts  Attempt 2          Memo Bolanos RN

## 2020-02-20 ENCOUNTER — OFFICE VISIT (OUTPATIENT)
Dept: ORTHOPEDIC SURGERY | Facility: CLINIC | Age: 55
End: 2020-02-20

## 2020-02-20 DIAGNOSIS — S82.62XA CLOSED DISPLACED FRACTURE OF LATERAL MALLEOLUS OF LEFT FIBULA, INITIAL ENCOUNTER: ICD-10-CM

## 2020-02-20 DIAGNOSIS — R52 PAIN: Primary | ICD-10-CM

## 2020-02-20 DIAGNOSIS — Z09 STATUS POST ORTHOPEDIC SURGERY, FOLLOW-UP EXAM: ICD-10-CM

## 2020-02-20 DIAGNOSIS — S93.432A SYNDESMOTIC DISRUPTION OF LEFT ANKLE, INITIAL ENCOUNTER: ICD-10-CM

## 2020-02-20 PROCEDURE — 73610 X-RAY EXAM OF ANKLE: CPT | Performed by: ORTHOPAEDIC SURGERY

## 2020-02-20 PROCEDURE — 99024 POSTOP FOLLOW-UP VISIT: CPT | Performed by: ORTHOPAEDIC SURGERY

## 2020-02-20 NOTE — PROGRESS NOTES
Subjective: That is post ORIF left lateral malleolar fracture with syndesmotic tight rope   Patient ID: Marilyn Gottlieb is a 55 y.o. female.    Chief Complaint:    History of Present Illness patient history of doing well.  Minimal pain.  Has been nonweightbearing.       Social History     Occupational History   • Not on file   Tobacco Use   • Smoking status: Current Every Day Smoker     Packs/day: 0.50     Years: 20.00     Pack years: 10.00   • Smokeless tobacco: Never Used   Substance and Sexual Activity   • Alcohol use: No   • Drug use: No   • Sexual activity: Defer      Review of Systems   Constitutional: Negative for chills, diaphoresis, fever and unexpected weight change.   HENT: Negative for hearing loss, nosebleeds, sore throat and tinnitus.    Eyes: Negative for pain and visual disturbance.   Respiratory: Negative for cough, shortness of breath and wheezing.    Cardiovascular: Negative for chest pain and palpitations.   Gastrointestinal: Negative for abdominal pain, diarrhea, nausea and vomiting.   Endocrine: Negative for cold intolerance, heat intolerance and polydipsia.   Genitourinary: Negative for difficulty urinating, dysuria and hematuria.   Musculoskeletal: Positive for arthralgias and myalgias. Negative for joint swelling.   Skin: Negative for rash and wound.   Allergic/Immunologic: Negative for environmental allergies.   Neurological: Negative for dizziness, syncope and numbness.   Hematological: Does not bruise/bleed easily.   Psychiatric/Behavioral: Negative for dysphoric mood and sleep disturbance. The patient is not nervous/anxious.          Past Medical History:   Diagnosis Date   • Acid reflux    • Anxiety    • Depression    • Eczema     ON TRUNK, BILATERAL ARMS, PATIENT SCRATCHES   • High cholesterol     SUPPOSED TO BE ON MEDICATIONS   • Hypertension    • Migraine     TAKES TOPAMAX   • MRSA infection     LEFT AXILLA 2014   • Panic attacks      Past Surgical History:   Procedure Laterality Date    • ANKLE OPEN REDUCTION INTERNAL FIXATION Left 2/1/2020    Procedure: ANKLE OPEN REDUCTION INTERNAL FIXATION-with syndesmotic tight rope.  Lateral malleolus.;  Surgeon: Cliff Nunn MD;  Location:  [facility];  Service: Orthopedics   • CHOLECYSTECTOMY N/A 11/30/2016    Procedure: CHOLECYSTECTOMY LAPAROSCOPIC;  Surgeon: Clemente Meeks MD;  Location:  [facility] OR;  Service:    • HYSTERECTOMY      ABDOMINAL   • ROTATOR CUFF REPAIR     • TUBAL ABDOMINAL LIGATION       Family History   Problem Relation Age of Onset   • Cancer Father          Objective:  There were no vitals filed for this visit.  There were no vitals filed for this visit.  There is no height or weight on file to calculate BMI.        Ortho Exam   AP lateral oblique view shows anatomic alignment of the ankle fracture.  No prior x-rays available for comparison.  She is alert and oriented x3.  Wounds completely benign.  Calf is nontender.  Good distal pulses    Assessment:        1. Pain    2. Closed displaced fracture of lateral malleolus of left fibula, initial encounter    3. Syndesmotic disruption of left ankle, initial encounter    4. Status post orthopedic surgery, follow-up exam           Plan: Continue the aspirin.  Placed in a fracture boot they can remove for range of motion exercises but she is to remain strictly nonweightbearing.  Return in 3 weeks with repeat x-ray            Work Status:    AYSE query complete.    Orders:  Orders Placed This Encounter   Procedures   • XR Ankle 3+ View Left       Medications:  No orders of the defined types were placed in this encounter.      Followup:  Return in about 3 weeks (around 3/12/2020).          Dictated utilizing Dragon dictation

## 2020-03-11 ENCOUNTER — OFFICE VISIT (OUTPATIENT)
Dept: ORTHOPEDIC SURGERY | Facility: CLINIC | Age: 55
End: 2020-03-11

## 2020-03-11 VITALS — BODY MASS INDEX: 37.65 KG/M2 | HEIGHT: 65 IN | WEIGHT: 226 LBS

## 2020-03-11 DIAGNOSIS — Z09 STATUS POST ORTHOPEDIC SURGERY, FOLLOW-UP EXAM: Primary | ICD-10-CM

## 2020-03-11 DIAGNOSIS — S82.62XA CLOSED DISPLACED FRACTURE OF LATERAL MALLEOLUS OF LEFT FIBULA, INITIAL ENCOUNTER: ICD-10-CM

## 2020-03-11 DIAGNOSIS — S93.432A SYNDESMOTIC DISRUPTION OF LEFT ANKLE, INITIAL ENCOUNTER: ICD-10-CM

## 2020-03-11 PROCEDURE — 73610 X-RAY EXAM OF ANKLE: CPT | Performed by: ORTHOPAEDIC SURGERY

## 2020-03-11 PROCEDURE — 99024 POSTOP FOLLOW-UP VISIT: CPT | Performed by: ORTHOPAEDIC SURGERY

## 2020-03-11 RX ORDER — DULOXETIN HYDROCHLORIDE 30 MG/1
CAPSULE, DELAYED RELEASE ORAL
COMMUNITY
Start: 2020-02-16

## 2020-03-11 NOTE — PROGRESS NOTES
Subjective: Status post ORIF left lateral malleolus with syndesmotic tight rope     Patient ID: Marilyn Gottlieb is a 55 y.o. female.    Chief Complaint:    History of Present Illness patient is 42 doing well.  Has been nonweightbearing.  Having some problems with swelling but minimal pain at this time.       Social History     Occupational History   • Not on file   Tobacco Use   • Smoking status: Current Every Day Smoker     Packs/day: 0.50     Years: 20.00     Pack years: 10.00   • Smokeless tobacco: Never Used   Substance and Sexual Activity   • Alcohol use: No   • Drug use: No   • Sexual activity: Defer      Review of Systems   Constitutional: Negative for chills, diaphoresis, fever and unexpected weight change.   HENT: Negative for hearing loss, nosebleeds, sore throat and tinnitus.    Eyes: Negative for pain and visual disturbance.   Respiratory: Negative for cough, shortness of breath and wheezing.    Cardiovascular: Negative for chest pain and palpitations.   Gastrointestinal: Negative for abdominal pain, diarrhea, nausea and vomiting.   Endocrine: Negative for cold intolerance, heat intolerance and polydipsia.   Genitourinary: Negative for difficulty urinating, dysuria and hematuria.   Musculoskeletal: Positive for arthralgias, joint swelling and myalgias.   Skin: Negative for rash and wound.   Allergic/Immunologic: Negative for environmental allergies.   Neurological: Negative for dizziness, syncope and numbness.   Hematological: Does not bruise/bleed easily.   Psychiatric/Behavioral: Negative for dysphoric mood and sleep disturbance. The patient is not nervous/anxious.          Past Medical History:   Diagnosis Date   • Acid reflux    • Anxiety    • Depression    • Eczema     ON TRUNK, BILATERAL ARMS, PATIENT SCRATCHES   • High cholesterol     SUPPOSED TO BE ON MEDICATIONS   • Hypertension    • Migraine     TAKES TOPAMAX   • MRSA infection     LEFT AXILLA 2014   • Panic attacks      Past Surgical History:    Procedure Laterality Date   • ANKLE OPEN REDUCTION INTERNAL FIXATION Left 2/1/2020    Procedure: ANKLE OPEN REDUCTION INTERNAL FIXATION-with syndesmotic tight rope.  Lateral malleolus.;  Surgeon: Cliff Nunn MD;  Location:  [facility];  Service: Orthopedics   • CHOLECYSTECTOMY N/A 11/30/2016    Procedure: CHOLECYSTECTOMY LAPAROSCOPIC;  Surgeon: Clemente Meeks MD;  Location:  [facility];  Service:    • HYSTERECTOMY      ABDOMINAL   • ROTATOR CUFF REPAIR     • TUBAL ABDOMINAL LIGATION       Family History   Problem Relation Age of Onset   • Cancer Father          Objective:  There were no vitals filed for this visit.      03/11/20  1100   Weight: 103 kg (226 lb)     Body mass index is 37.26 kg/m².        Ortho Exam   AP lateral oblique view show anatomic alignment of the fracture.  Abundant callus is not seen as of yet.  No prior x-rays available for comparison.  She is alert and oriented x3.  Calf nontender.  No motor or sensory deficit all wounds are benign    Assessment:        1. Status post orthopedic surgery, follow-up exam    2. Closed displaced fracture of lateral malleolus of left fibula, initial encounter    3. Syndesmotic disruption of left ankle, initial encounter           Plan: She can begin to weight-bear as tolerated in the fracture boot and that was emphasized to the patient as pain permits.  Return in 3 weeks with a repeat x-ray.  When she can ambulate in the fracture boot with minimal to no pain will convert to an active ankle brace.  Answered all questions            Work Status:    AYSE query complete.    Orders:  Orders Placed This Encounter   Procedures   • XR Ankle 3+ View Left       Medications:  No orders of the defined types were placed in this encounter.      Followup:  Return in about 3 weeks (around 4/1/2020).          Dictated utilizing Dragon dictation

## 2020-03-20 ENCOUNTER — TELEPHONE (OUTPATIENT)
Dept: ORTHOPEDIC SURGERY | Facility: CLINIC | Age: 55
End: 2020-03-20

## 2020-03-20 DIAGNOSIS — S93.432A SYNDESMOTIC DISRUPTION OF LEFT ANKLE, INITIAL ENCOUNTER: ICD-10-CM

## 2020-03-20 DIAGNOSIS — Z09 STATUS POST ORTHOPEDIC SURGERY, FOLLOW-UP EXAM: Primary | ICD-10-CM

## 2020-03-20 DIAGNOSIS — S82.62XA CLOSED DISPLACED FRACTURE OF LATERAL MALLEOLUS OF LEFT FIBULA, INITIAL ENCOUNTER: ICD-10-CM

## 2020-03-20 RX ORDER — HYDROCODONE BITARTRATE AND ACETAMINOPHEN 7.5; 325 MG/1; MG/1
1 TABLET ORAL EVERY 6 HOURS PRN
Qty: 36 TABLET | Refills: 0 | Status: SHIPPED | OUTPATIENT
Start: 2020-03-20 | End: 2020-04-01 | Stop reason: SDUPTHER

## 2020-03-20 NOTE — TELEPHONE ENCOUNTER
Ms. Gottlieb is requesting a refill on pain med, she has a follow up on Monday but states she is needing something to control pain and help her sleep.  NSAIDs are not helping.

## 2020-03-23 ENCOUNTER — OFFICE VISIT (OUTPATIENT)
Dept: ORTHOPEDIC SURGERY | Facility: CLINIC | Age: 55
End: 2020-03-23

## 2020-03-23 VITALS — WEIGHT: 226 LBS | BODY MASS INDEX: 37.65 KG/M2 | HEIGHT: 65 IN

## 2020-03-23 DIAGNOSIS — Z09 STATUS POST ORTHOPEDIC SURGERY, FOLLOW-UP EXAM: Primary | ICD-10-CM

## 2020-03-23 DIAGNOSIS — S82.62XA CLOSED DISPLACED FRACTURE OF LATERAL MALLEOLUS OF LEFT FIBULA, INITIAL ENCOUNTER: ICD-10-CM

## 2020-03-23 DIAGNOSIS — S93.432A SYNDESMOTIC DISRUPTION OF LEFT ANKLE, INITIAL ENCOUNTER: ICD-10-CM

## 2020-03-23 PROCEDURE — 99024 POSTOP FOLLOW-UP VISIT: CPT | Performed by: ORTHOPAEDIC SURGERY

## 2020-03-23 PROCEDURE — 73610 X-RAY EXAM OF ANKLE: CPT | Performed by: ORTHOPAEDIC SURGERY

## 2020-03-23 PROCEDURE — 73562 X-RAY EXAM OF KNEE 3: CPT | Performed by: ORTHOPAEDIC SURGERY

## 2020-03-23 RX ORDER — METHYLPREDNISOLONE 4 MG/1
TABLET ORAL
Qty: 21 TABLET | Refills: 0 | Status: SHIPPED | OUTPATIENT
Start: 2020-03-23 | End: 2020-04-23

## 2020-03-23 NOTE — PROGRESS NOTES
Subjective: Status post ORIF left lateral malleolus with syndesmotic repair     Patient ID: Marilyn Gottlieb is a 55 y.o. female.    Chief Complaint:    History of Present Illness patient turns earlier that she started weightbearing she is having some soreness in the left knee and some swelling in the ankle.  Not have any significant calf pain.       Social History     Occupational History   • Not on file   Tobacco Use   • Smoking status: Current Every Day Smoker     Packs/day: 0.50     Years: 20.00     Pack years: 10.00   • Smokeless tobacco: Never Used   Substance and Sexual Activity   • Alcohol use: No   • Drug use: No   • Sexual activity: Defer      Review of Systems   Constitutional: Negative for chills, diaphoresis, fever and unexpected weight change.   HENT: Negative for hearing loss, nosebleeds, sore throat and tinnitus.    Eyes: Negative for pain and visual disturbance.   Respiratory: Negative for cough, shortness of breath and wheezing.    Cardiovascular: Negative for chest pain and palpitations.   Gastrointestinal: Negative for abdominal pain, diarrhea, nausea and vomiting.   Endocrine: Negative for cold intolerance, heat intolerance and polydipsia.   Genitourinary: Negative for difficulty urinating, dysuria and hematuria.   Musculoskeletal: Positive for arthralgias, joint swelling and myalgias.   Skin: Negative for rash and wound.   Allergic/Immunologic: Negative for environmental allergies.   Neurological: Negative for dizziness, syncope and numbness.   Hematological: Does not bruise/bleed easily.   Psychiatric/Behavioral: Negative for dysphoric mood and sleep disturbance. The patient is not nervous/anxious.          Past Medical History:   Diagnosis Date   • Acid reflux    • Anxiety    • Depression    • Eczema     ON TRUNK, BILATERAL ARMS, PATIENT SCRATCHES   • High cholesterol     SUPPOSED TO BE ON MEDICATIONS   • Hypertension    • Migraine     TAKES TOPAMAX   • MRSA infection     LEFT AXILLA 2014   •  Panic attacks      Past Surgical History:   Procedure Laterality Date   • ANKLE OPEN REDUCTION INTERNAL FIXATION Left 2/1/2020    Procedure: ANKLE OPEN REDUCTION INTERNAL FIXATION-with syndesmotic tight rope.  Lateral malleolus.;  Surgeon: Cliff Nunn MD;  Location:  [facility];  Service: Orthopedics   • CHOLECYSTECTOMY N/A 11/30/2016    Procedure: CHOLECYSTECTOMY LAPAROSCOPIC;  Surgeon: Clemente Meeks MD;  Location:  [facility];  Service:    • HYSTERECTOMY      ABDOMINAL   • ROTATOR CUFF REPAIR     • TUBAL ABDOMINAL LIGATION       Family History   Problem Relation Age of Onset   • Cancer Father          Objective:  There were no vitals filed for this visit.      03/23/20  0935   Weight: 103 kg (226 lb)     Body mass index is 37.26 kg/m².        Ortho Exam   AP lateral sunrise view of the knee is completely within normal limits in AP lateral oblique view of the ankle shows no change in the position with anatomic alignment of the ankle itself.  She is alert and oriented x3.  The knee has no swelling effusion erythema and she has 0 to 125 degrees of motion with no instability with minimal crepitance.  Her calf is nontender.  She has good pulses no motor or sensory deficit.  There is some swelling noted to the foot but there is no motor or sensory deficit she has good capillary refill.  All wounds are benign skin is cool to touch.    Assessment:        1. Status post orthopedic surgery, follow-up exam    2. Closed displaced fracture of lateral malleolus of left fibula, initial encounter    3. Syndesmotic disruption of left ankle, initial encounter           Plan: It may be that the patient was putting too much weight too soon.  I did instruct her to weight-bear as tolerated but may be more than the leg will allow.  I am to take her off all weightbearing on that left leg.  I did prescribe a steroid pack while she is taken the steroid she is to drop her aspirin to 325 once a day instead of twice a day.  After she  completes the steroid she is to get back on 325 twice daily.  Again remain strictly nonweightbearing till she returns with scheduled appointment in 2 weeks with an x-ray of the ankle.  Answered all questions            Work Status:    AYSE query complete.    Orders:  Orders Placed This Encounter   Procedures   • XR Ankle 3+ View Left   • XR Knee 3 View Left       Medications:  No orders of the defined types were placed in this encounter.      Followup:  Return in about 2 weeks (around 4/6/2020).          Dictated utilizing Dragon dictation

## 2020-04-01 ENCOUNTER — OFFICE VISIT (OUTPATIENT)
Dept: ORTHOPEDIC SURGERY | Facility: CLINIC | Age: 55
End: 2020-04-01

## 2020-04-01 DIAGNOSIS — G90.522 COMPLEX REGIONAL PAIN SYNDROME TYPE 1 OF LEFT LOWER EXTREMITY: ICD-10-CM

## 2020-04-01 DIAGNOSIS — S93.432A SYNDESMOTIC DISRUPTION OF LEFT ANKLE, INITIAL ENCOUNTER: ICD-10-CM

## 2020-04-01 DIAGNOSIS — Z09 STATUS POST ORTHOPEDIC SURGERY, FOLLOW-UP EXAM: ICD-10-CM

## 2020-04-01 DIAGNOSIS — R52 PAIN: Primary | ICD-10-CM

## 2020-04-01 DIAGNOSIS — S82.62XA CLOSED DISPLACED FRACTURE OF LATERAL MALLEOLUS OF LEFT FIBULA, INITIAL ENCOUNTER: ICD-10-CM

## 2020-04-01 PROCEDURE — 99024 POSTOP FOLLOW-UP VISIT: CPT | Performed by: ORTHOPAEDIC SURGERY

## 2020-04-01 PROCEDURE — 73610 X-RAY EXAM OF ANKLE: CPT | Performed by: ORTHOPAEDIC SURGERY

## 2020-04-01 RX ORDER — GABAPENTIN 300 MG/1
300 CAPSULE ORAL 3 TIMES DAILY
Qty: 90 CAPSULE | Refills: 0 | Status: SHIPPED | OUTPATIENT
Start: 2020-04-01 | End: 2020-05-21

## 2020-04-01 RX ORDER — HYDROCODONE BITARTRATE AND ACETAMINOPHEN 7.5; 325 MG/1; MG/1
1 TABLET ORAL EVERY 6 HOURS PRN
Qty: 36 TABLET | Refills: 0 | Status: SHIPPED | OUTPATIENT
Start: 2020-04-01 | End: 2020-05-21

## 2020-04-01 NOTE — PROGRESS NOTES
Subjective: Status post ORIF left leg malleolus and syndesmotic tight rope     Patient ID: Marilyn Gottlieb is a 55 y.o. female.    Chief Complaint:    History of Present Illness patient is 6 weeks out.  Her swelling subsided but is been complaining of pain and is constant discomfort now with periods of exacerbation do seem she is developing RSD.       Social History     Occupational History   • Not on file   Tobacco Use   • Smoking status: Current Every Day Smoker     Packs/day: 0.50     Years: 20.00     Pack years: 10.00   • Smokeless tobacco: Never Used   Substance and Sexual Activity   • Alcohol use: No   • Drug use: No   • Sexual activity: Defer      Review of Systems   Constitutional: Negative for chills, diaphoresis, fever and unexpected weight change.   HENT: Negative for hearing loss, nosebleeds, sore throat and tinnitus.    Eyes: Negative for pain and visual disturbance.   Respiratory: Negative for cough, shortness of breath and wheezing.    Cardiovascular: Negative for chest pain and palpitations.   Gastrointestinal: Negative for abdominal pain, diarrhea, nausea and vomiting.   Endocrine: Negative for cold intolerance, heat intolerance and polydipsia.   Genitourinary: Negative for difficulty urinating, dysuria and hematuria.   Musculoskeletal: Positive for arthralgias and myalgias. Negative for joint swelling.   Skin: Negative for rash and wound.   Allergic/Immunologic: Negative for environmental allergies.   Neurological: Negative for dizziness, syncope and numbness.   Hematological: Does not bruise/bleed easily.   Psychiatric/Behavioral: Negative for dysphoric mood and sleep disturbance. The patient is not nervous/anxious.          Past Medical History:   Diagnosis Date   • Acid reflux    • Anxiety    • Depression    • Eczema     ON TRUNK, BILATERAL ARMS, PATIENT SCRATCHES   • High cholesterol     SUPPOSED TO BE ON MEDICATIONS   • Hypertension    • Migraine     TAKES TOPAMAX   • MRSA infection     LEFT  AXILLA 2014   • Panic attacks      Past Surgical History:   Procedure Laterality Date   • ANKLE OPEN REDUCTION INTERNAL FIXATION Left 2/1/2020    Procedure: ANKLE OPEN REDUCTION INTERNAL FIXATION-with syndesmotic tight rope.  Lateral malleolus.;  Surgeon: Cliff Nunn MD;  Location:  [facility];  Service: Orthopedics   • CHOLECYSTECTOMY N/A 11/30/2016    Procedure: CHOLECYSTECTOMY LAPAROSCOPIC;  Surgeon: Clemente Meeks MD;  Location:  [facility];  Service:    • HYSTERECTOMY      ABDOMINAL   • ROTATOR CUFF REPAIR     • TUBAL ABDOMINAL LIGATION       Family History   Problem Relation Age of Onset   • Cancer Father          Objective:  There were no vitals filed for this visit.  There were no vitals filed for this visit.  There is no height or weight on file to calculate BMI.        Ortho Exam   AP lateral oblique view show anatomic alignment of the fracture.  Abundant callus is not seen.  Her swelling is significantly reduced.  Her calf is nontender.  She has good distal pulses no motor or sensory deficit good capillary refill complain of significant pain to touch laterally again there is no erythema.    Assessment:      No diagnosis found.       Plan: Believe she is developing an early RSD in that left lower legs are can add gabapentin in addition to refilling her pain medication.  Told her it is put only as much weight as pain permits and if she cannot put any weight on it do not put any weight on it she is not ambulatory he needs to keep the leg elevated.  Return in 3 weeks with repeat x-ray.            Work Status:    AYSE query complete.    Orders:  No orders of the defined types were placed in this encounter.      Medications:  No orders of the defined types were placed in this encounter.      Followup:  No follow-ups on file.          Dictated utilizing Dragon dictation

## 2020-04-23 ENCOUNTER — OFFICE VISIT (OUTPATIENT)
Dept: ORTHOPEDIC SURGERY | Facility: CLINIC | Age: 55
End: 2020-04-23

## 2020-04-23 VITALS — WEIGHT: 210 LBS | HEIGHT: 65 IN | BODY MASS INDEX: 34.99 KG/M2

## 2020-04-23 DIAGNOSIS — M94.262 CHONDROMALACIA OF LEFT KNEE: ICD-10-CM

## 2020-04-23 DIAGNOSIS — R52 PAIN: Primary | ICD-10-CM

## 2020-04-23 DIAGNOSIS — Z09 STATUS POST ORTHOPEDIC SURGERY, FOLLOW-UP EXAM: ICD-10-CM

## 2020-04-23 DIAGNOSIS — S93.432A SYNDESMOTIC DISRUPTION OF LEFT ANKLE, INITIAL ENCOUNTER: ICD-10-CM

## 2020-04-23 DIAGNOSIS — S82.62XA CLOSED DISPLACED FRACTURE OF LATERAL MALLEOLUS OF LEFT FIBULA, INITIAL ENCOUNTER: ICD-10-CM

## 2020-04-23 PROCEDURE — 73610 X-RAY EXAM OF ANKLE: CPT | Performed by: ORTHOPAEDIC SURGERY

## 2020-04-23 PROCEDURE — 20610 DRAIN/INJ JOINT/BURSA W/O US: CPT | Performed by: ORTHOPAEDIC SURGERY

## 2020-04-23 PROCEDURE — 99213 OFFICE O/P EST LOW 20 MIN: CPT | Performed by: ORTHOPAEDIC SURGERY

## 2020-04-23 RX ORDER — BACLOFEN 20 MG/1
TABLET ORAL
COMMUNITY
Start: 2020-04-14

## 2020-04-23 RX ORDER — BETAMETHASONE SODIUM PHOSPHATE AND BETAMETHASONE ACETATE 3; 3 MG/ML; MG/ML
6 INJECTION, SUSPENSION INTRA-ARTICULAR; INTRALESIONAL; INTRAMUSCULAR; SOFT TISSUE
Status: COMPLETED | OUTPATIENT
Start: 2020-04-23 | End: 2020-04-23

## 2020-04-23 RX ORDER — LIDOCAINE HYDROCHLORIDE 10 MG/ML
4 INJECTION, SOLUTION EPIDURAL; INFILTRATION; INTRACAUDAL; PERINEURAL
Status: COMPLETED | OUTPATIENT
Start: 2020-04-23 | End: 2020-04-23

## 2020-04-23 RX ADMIN — BETAMETHASONE SODIUM PHOSPHATE AND BETAMETHASONE ACETATE 6 MG: 3; 3 INJECTION, SUSPENSION INTRA-ARTICULAR; INTRALESIONAL; INTRAMUSCULAR; SOFT TISSUE at 10:22

## 2020-04-23 RX ADMIN — LIDOCAINE HYDROCHLORIDE 4 ML: 10 INJECTION, SOLUTION EPIDURAL; INFILTRATION; INTRACAUDAL; PERINEURAL at 10:22

## 2020-04-23 NOTE — PROGRESS NOTES
Subjective: Status post ORIF left ankle, chondromalacia knee     Patient ID: Marilyn Gottlieb is a 55 y.o. female.    Chief Complaint:    History of Present Illness 55-year-old female returns she is almost 3 months out from the surgery states the ankle is better but the knee is giving her more problems i.e. the left knee.  No history of trauma.  Again when she was seen last she was instructed repeatedly that she can weight-bear as tolerated in the fracture boot using a walker but she presents to the office today without her walker and walking in the fracture boot but obviously heavy pain is she is limping not only because of the knee because of some ankle pain.  He questioned her she does walk around the house full weightbearing despite the pain and only occasionally using the walker       Social History     Occupational History   • Not on file   Tobacco Use   • Smoking status: Current Every Day Smoker     Packs/day: 0.50     Years: 20.00     Pack years: 10.00   • Smokeless tobacco: Never Used   Substance and Sexual Activity   • Alcohol use: No   • Drug use: No   • Sexual activity: Defer      Review of Systems   Constitutional: Negative for chills, diaphoresis, fever and unexpected weight change.   HENT: Negative for hearing loss, nosebleeds, sore throat and tinnitus.    Eyes: Negative for pain and visual disturbance.   Respiratory: Negative for cough, shortness of breath and wheezing.    Cardiovascular: Negative for chest pain and palpitations.   Gastrointestinal: Negative for abdominal pain, diarrhea, nausea and vomiting.   Endocrine: Negative for cold intolerance, heat intolerance and polydipsia.   Genitourinary: Negative for difficulty urinating, dysuria and hematuria.   Musculoskeletal: Positive for arthralgias, joint swelling and myalgias.   Skin: Negative for rash and wound.   Allergic/Immunologic: Negative for environmental allergies.   Neurological: Negative for dizziness, syncope and numbness.   Hematological:  Does not bruise/bleed easily.   Psychiatric/Behavioral: Negative for dysphoric mood and sleep disturbance. The patient is not nervous/anxious.          Past Medical History:   Diagnosis Date   • Acid reflux    • Anxiety    • Depression    • Eczema     ON TRUNK, BILATERAL ARMS, PATIENT SCRATCHES   • High cholesterol     SUPPOSED TO BE ON MEDICATIONS   • Hypertension    • Migraine     TAKES TOPAMAX   • MRSA infection     LEFT AXILLA 2014   • Panic attacks      Past Surgical History:   Procedure Laterality Date   • ANKLE OPEN REDUCTION INTERNAL FIXATION Left 2/1/2020    Procedure: ANKLE OPEN REDUCTION INTERNAL FIXATION-with syndesmotic tight rope.  Lateral malleolus.;  Surgeon: Cliff Nunn MD;  Location: [facility] OR;  Service: Orthopedics   • CHOLECYSTECTOMY N/A 11/30/2016    Procedure: CHOLECYSTECTOMY LAPAROSCOPIC;  Surgeon: Clemente Meeks MD;  Location: [facility] OR;  Service:    • HYSTERECTOMY      ABDOMINAL   • ROTATOR CUFF REPAIR     • TUBAL ABDOMINAL LIGATION       Family History   Problem Relation Age of Onset   • Cancer Father          Objective:  There were no vitals filed for this visit.      04/23/20  0959   Weight: 95.3 kg (210 lb)     Body mass index is 34.63 kg/m².        Ortho Exam   AP lateral oblique view of the ankle shows increasing callus formation at the fracture site of the lateral malleolus compared to previous x-rays.  Review of x-rays done of the knee previously again showed minimal decrease in the joint space in the medial and patellofemoral compartment.  She is alert and oriented x3.  She has some persistent swelling lateral of the ankle but the wounds are benign.  Her calf is nontender.  There is no motor or sensory deficit.  The knee has no effusion or swelling the skin is cool to touch she has 0 125 degrees of motion with minimal patellofemoral crepitus and mild to moderate pain.  Mild joint line tenderness but negative Zeus's.  No instability at 0 90 degrees nor any varus or valgus  instability 0 30 degrees.  Quad and hamstring function are both 4-1/2 out of 5 secondary to pain the calf is nontender she has good capillary refill.    Assessment:        1. Pain    2. Status post orthopedic surgery, follow-up exam    3. Closed displaced fracture of lateral malleolus of left fibula, initial encounter    4. Syndesmotic disruption of left ankle, initial encounter    5. Chondromalacia of left knee           Plan: Spent over 10 minutes with the patient again emphasizing what weightbearing as tolerated needs and the need for the use the walker if she is having any pain.  She thought it she walked out of despite the pain she can work it out I told her that is not the case at all.  Once again I emphasized for over 5 minutes using the walker to ambulate if there is any discomfort in the ankle she must wear the ankle brace.  As far as the knee is concerned I think she is aggravated chondromalacia symptoms that Kolby has in the knee because of walking with the boot and walker and limping so after reviewing treatment options with her received a cortisone injection a lidocaine Celestone 41 given through the superolateral portal tolerating it well.  Postinjection instructions given to the patient.  Return to see me in 4 weeks get an x-ray of the ankle.  Answered all questions.  Large Joint Arthrocentesis: L knee  Date/Time: 4/23/2020 10:22 AM  Consent given by: patient  Site marked: site marked  Timeout: Immediately prior to procedure a time out was called to verify the correct patient, procedure, equipment, support staff and site/side marked as required   Supporting Documentation  Indications: pain   Procedure Details  Location: knee - L knee  Preparation: Patient was prepped and draped in the usual sterile fashion  Needle size: 22 G  Approach: superior (LATERAL)  Medications administered: 4 mL lidocaine PF 1% 1 %; 6 mg betamethasone acetate-betamethasone sodium phosphate 6 (3-3) MG/ML  Patient tolerance:  patient tolerated the procedure well with no immediate complications                  Work Status:    [facility] query complete.    Orders:  Orders Placed This Encounter   Procedures   • Large Joint Arthrocentesis: L knee   • XR Ankle 3+ View Left       Medications:  No orders of the defined types were placed in this encounter.      Followup:  Return in about 4 weeks (around 5/21/2020).          Dictated utilizing Dragon dictation

## 2020-05-21 ENCOUNTER — OFFICE VISIT (OUTPATIENT)
Dept: ORTHOPEDIC SURGERY | Facility: CLINIC | Age: 55
End: 2020-05-21

## 2020-05-21 VITALS — BODY MASS INDEX: 34.99 KG/M2 | WEIGHT: 210 LBS | HEIGHT: 65 IN

## 2020-05-21 DIAGNOSIS — S82.62XA CLOSED DISPLACED FRACTURE OF LATERAL MALLEOLUS OF LEFT FIBULA, INITIAL ENCOUNTER: ICD-10-CM

## 2020-05-21 DIAGNOSIS — S93.432A SYNDESMOTIC DISRUPTION OF LEFT ANKLE, INITIAL ENCOUNTER: ICD-10-CM

## 2020-05-21 DIAGNOSIS — Z09 STATUS POST ORTHOPEDIC SURGERY, FOLLOW-UP EXAM: Primary | ICD-10-CM

## 2020-05-21 PROCEDURE — 99213 OFFICE O/P EST LOW 20 MIN: CPT | Performed by: ORTHOPAEDIC SURGERY

## 2020-05-21 PROCEDURE — 73610 X-RAY EXAM OF ANKLE: CPT | Performed by: ORTHOPAEDIC SURGERY

## 2020-05-21 NOTE — PROGRESS NOTES
Subjective: Status post ORIF left fibular fracture with syndesmotic repair     Patient ID: Marilyn Gottlieb is a 55 y.o. female.    Chief Complaint:    History of Present Illness patient is over 3 months out is doing well.  She is ambulating independently with a cane or crutch and is having no pain or discomfort.       Social History     Occupational History   • Not on file   Tobacco Use   • Smoking status: Current Every Day Smoker     Packs/day: 0.50     Years: 20.00     Pack years: 10.00   • Smokeless tobacco: Never Used   Substance and Sexual Activity   • Alcohol use: No   • Drug use: No   • Sexual activity: Defer      Review of Systems   Constitutional: Negative for chills, diaphoresis, fever and unexpected weight change.   HENT: Negative for hearing loss, nosebleeds, sore throat and tinnitus.    Eyes: Negative for pain and visual disturbance.   Respiratory: Negative for cough, shortness of breath and wheezing.    Cardiovascular: Negative for chest pain and palpitations.   Gastrointestinal: Negative for abdominal pain, diarrhea, nausea and vomiting.   Endocrine: Negative for cold intolerance, heat intolerance and polydipsia.   Genitourinary: Negative for difficulty urinating, dysuria and hematuria.   Musculoskeletal: Positive for arthralgias and myalgias. Negative for joint swelling.   Skin: Negative for rash and wound.   Allergic/Immunologic: Negative for environmental allergies.   Neurological: Negative for dizziness, syncope and numbness.   Hematological: Does not bruise/bleed easily.   Psychiatric/Behavioral: Negative for dysphoric mood and sleep disturbance. The patient is not nervous/anxious.          Past Medical History:   Diagnosis Date   • Acid reflux    • Anxiety    • Depression    • Eczema     ON TRUNK, BILATERAL ARMS, PATIENT SCRATCHES   • High cholesterol     SUPPOSED TO BE ON MEDICATIONS   • Hypertension    • Migraine     TAKES TOPAMAX   • MRSA infection     LEFT AXILLA 2014   • Panic attacks      Past  Surgical History:   Procedure Laterality Date   • ANKLE OPEN REDUCTION INTERNAL FIXATION Left 2/1/2020    Procedure: ANKLE OPEN REDUCTION INTERNAL FIXATION-with syndesmotic tight rope.  Lateral malleolus.;  Surgeon: Cliff Nunn MD;  Location:  [facility] OR;  Service: Orthopedics   • CHOLECYSTECTOMY N/A 11/30/2016    Procedure: CHOLECYSTECTOMY LAPAROSCOPIC;  Surgeon: Clemente Meeks MD;  Location:  [facility] OR;  Service:    • HYSTERECTOMY      ABDOMINAL   • ROTATOR CUFF REPAIR     • TUBAL ABDOMINAL LIGATION       Family History   Problem Relation Age of Onset   • Cancer Father          Objective:  There were no vitals filed for this visit.      05/21/20  0846   Weight: 95.3 kg (210 lb)     Body mass index is 34.63 kg/m².        Ortho Exam   AP lateral oblique shows complete consolidation at the fracture site with an anatomical syndesmotic reduction.  Compared to previous x-rays is complete healing.  She is alert and oriented x3.  The ankle shows minimal swelling all wounds are benign.  She has equal dorsi and plantarflexion of that left foot compared to the right.  There is no pain to palpation over the medial malleolar or the lateral fracture.  She has good capillary refill the skin is cool to touch.  Negative drawer sign.  Her calf is nontender as is the Achilles tendon.  Again she walks without an antalgic gait.  Is resumed all activities without limitations.    Assessment:        1. Status post orthopedic surgery, follow-up exam    2. Closed displaced fracture of lateral malleolus of left fibula, initial encounter    3. Syndesmotic disruption of left ankle, initial encounter           Plan: Patient is doing well at this time.  She was instructed take occasional Advil or Aleve for any minor discomfort.  Return to see me in 6 weeks for final follow-up no x-rays necessary.  Answered all questions.            Work Status:    [employer] query complete.    Orders:  Orders Placed This Encounter   Procedures   • XR Ankle 3+ View  Left       Medications:  No orders of the defined types were placed in this encounter.      Followup:  Return in about 6 weeks (around 7/2/2020).          Dictated utilizing Dragon dictation

## (undated) DEVICE — GLV SURG EUDERMIC PF LTX 8 STRL

## (undated) DEVICE — 3M™ IOBAN™ 2 ANTIMICROBIAL INCISE DRAPE 6650EZ: Brand: IOBAN™ 2

## (undated) DEVICE — BNDG ELAS MATRX V/CLS 4IN 5YD LF

## (undated) DEVICE — BNDG ELAS ELITE V/CLOSE 6IN 5YD LF STRL

## (undated) DEVICE — APPL CHLORAPREP W/TINT 26ML ORNG

## (undated) DEVICE — BNDG ESMARK 6INX9FT STRL

## (undated) DEVICE — BONE SCREW, T10
Type: IMPLANTABLE DEVICE | Site: ANKLE | Status: NON-FUNCTIONAL
Brand: VARIAX
Removed: 2020-02-01

## (undated) DEVICE — DRP C/ARM 41X74IN

## (undated) DEVICE — CONTAINER,SPECIMEN,OR STERILE,4OZ: Brand: MEDLINE

## (undated) DEVICE — DRSNG TELFA PAD NONADH STR 1S 3X8IN

## (undated) DEVICE — SUCTION CANISTER, 1000CC,SAFELINER: Brand: DEROYAL

## (undated) DEVICE — BANDAGE,GAUZE,BULKEE II,4.5"X4.1YD,STRL: Brand: MEDLINE

## (undated) DEVICE — Device

## (undated) DEVICE — DECANT BG O JET

## (undated) DEVICE — PK BASIC ORTHO 90

## (undated) DEVICE — DISPOSABLE TOURNIQUET CUFF SINGLE BLADDER, SINGLE PORT AND LUER LOCK CONNECTOR: Brand: COLOR CUFF

## (undated) DEVICE — GLV SURG SENSICARE ORTHO PF LF 8 STRL

## (undated) DEVICE — STCKNT IMPERV 12IN STRL

## (undated) DEVICE — DRAPE,REIN 53X77,STERILE: Brand: MEDLINE

## (undated) DEVICE — FRAZIER SUCTION INSTRUMENT 10 FR W/CONTROL VENT & OBTURATOR: Brand: FRAZIER

## (undated) DEVICE — SYS SKIN CLS DERMABOND PRINEO W/22CM MESH TP

## (undated) DEVICE — SYR CONTRL LUERLOK 10CC

## (undated) DEVICE — SPNG GZ WOVN 4X4IN 12PLY 10/BX STRL

## (undated) DEVICE — GLV SURG NEOLON 2G PF LF 8 STRL

## (undated) DEVICE — SUT VIC 2/0 CP2 CR8 18IN J762D

## (undated) DEVICE — DRSNG PAD ABD 8X10IN STRL

## (undated) DEVICE — NDL SPINE 22G 31/2IN BLK

## (undated) DEVICE — 450 ML BOTTLE OF 0.05% CHLORHEXIDINE GLUCONATE IN 99.95% STERILE WATER FOR IRRIGATION, USP AND APPLICATOR.: Brand: IRRISEPT ANTIMICROBIAL WOUND LAVAGE

## (undated) DEVICE — SPLNT 1 STEP 4INX30IN

## (undated) DEVICE — DRILL, WL 70MM, AO-SHAFT: Brand: VARIAX

## (undated) DEVICE — TRY SKINPREP WET CHG 4PCT SPNG HIB